# Patient Record
Sex: MALE | Race: WHITE | NOT HISPANIC OR LATINO | Employment: OTHER
[De-identification: names, ages, dates, MRNs, and addresses within clinical notes are randomized per-mention and may not be internally consistent; named-entity substitution may affect disease eponyms.]

---

## 2017-01-30 ENCOUNTER — SURGERY (OUTPATIENT)
Age: 82
End: 2017-01-30

## 2017-01-30 ENCOUNTER — HOSPITAL ENCOUNTER (OUTPATIENT)
Facility: CLINIC | Age: 82
Discharge: HOME | End: 2017-01-30
Attending: INTERNAL MEDICINE | Admitting: INTERNAL MEDICINE
Payer: MEDICARE

## 2017-01-30 VITALS
DIASTOLIC BLOOD PRESSURE: 53 MMHG | OXYGEN SATURATION: 99 % | HEART RATE: 65 BPM | WEIGHT: 155 LBS | BODY MASS INDEX: 22.19 KG/M2 | SYSTOLIC BLOOD PRESSURE: 121 MMHG | RESPIRATION RATE: 15 BRPM | HEIGHT: 70 IN

## 2017-01-30 LAB — COLONOSCOPY: NORMAL

## 2017-01-30 PROCEDURE — 88305 TISSUE EXAM BY PATHOLOGIST: CPT | Performed by: INTERNAL MEDICINE

## 2017-01-30 PROCEDURE — 45382 COLONOSCOPY W/CONTROL BLEED: CPT | Performed by: INTERNAL MEDICINE

## 2017-01-30 PROCEDURE — 45385 COLONOSCOPY W/LESION REMOVAL: CPT | Performed by: INTERNAL MEDICINE

## 2017-01-30 PROCEDURE — 45378 DIAGNOSTIC COLONOSCOPY: CPT | Performed by: INTERNAL MEDICINE

## 2017-01-30 RX ORDER — LIDOCAINE 40 MG/G
CREAM TOPICAL
Status: DISCONTINUED | OUTPATIENT
Start: 2017-01-30 | End: 2017-01-30 | Stop reason: HOSPADM

## 2017-01-30 RX ORDER — ONDANSETRON 2 MG/ML
4 INJECTION INTRAMUSCULAR; INTRAVENOUS
Status: DISCONTINUED | OUTPATIENT
Start: 2017-01-30 | End: 2017-01-30 | Stop reason: HOSPADM

## 2017-01-30 NOTE — LETTER
"Patient:  Philip Miranda  :   1933  MRN:     [id number]        Mr.John NADEEM Miranda  [address]  [facility] [address]        2017    Dear ,    We are writing to inform you of your test results.    The polyp that was removed during the colonoscopy revealed evidence of a \"tubular adenoma\", this is a type of polyp that if not removed, could develop into a colon cancer.  This was completely removed at the time of your colonoscopy.    I would recommend having a repeat surveillance colonoscopy in 3 years - please review these findings and recommendations with your primary care physician.      It has been a pleasure participating in your care.  Please feel free to contact our clinic with any further questions.      Sincerely,    Ezio Ramirez MD    [facility] - Department of Medicine  Division of Gastroenterology        Resulted Orders   Surgical pathology exam   Result Value Ref Range    Copath Report       Patient Name: PHILIP MIRANDA  MR#: [id number]  Specimen #: [id number]  Collected: 2017  Received: 2017  Reported: 2017 18:10  Ordering Phy(s): EZIO OQUENDO    For improved result formatting, select 'View Enhanced Report Format'  under Linked Documents section.    SPECIMEN(S):  Colon polyp, descending    FINAL DIAGNOSIS:  LARGE INTESTINE, DESCENDING COLON, POLYPECTOMY X 4:  - Tubular adenomas  - No evidence of high grade dysplasia    I have personally reviewed all specimens and or slides, including the  listed special stains, and used them with my medical judgement to  determine the final diagnosis.    Electronically signed out by:    Martha Gloria M.D., [facility]    CLINICAL HISTORY:  83 year old man with a history of colon cancer.    GROSS:  The specimen is received in formalin with proper patient identification,  labeled \"descending colon polyps\".  The specimen consists of nine  tan-pink soft tissue fragments that range from " 0.1-0.6 cm in greatest  dimension.   The specimen is entirely submitted in cassette 1. (Dictated  by: Maris Mitchell 1/30/2017 02:32 PM)    MICROSCOPIC:  Microscopic examination was performed.    CPT Codes:  A: 90655-XT7    TESTING LAB LOCATION:  [facility], [address]   [address]  [phone]    COLLECTION SITE:  Client: [facility]  Location: [facility])

## 2017-01-30 NOTE — IP AVS SNAPSHOT
[facility], [place], Endoscopy    [address] [facility] [address]    Phone:  [phone]                                       After Visit Summary   1/30/2017    Washington Abdi    MRN: [id number]           After Visit Summary Signature Page     I have received my discharge instructions, and my questions have been answered. I have discussed any challenges I see with this plan with the nurse or doctor.    ..........................................................................................................................................  Patient/Patient Representative Signature      ..........................................................................................................................................  Patient Representative Print Name and Relationship to Patient    ..................................................               ................................................  Date                                            Time    ..........................................................................................................................................  Reviewed by Signature/Title    ...................................................              ..............................................  Date                                                            Time

## 2017-01-30 NOTE — IP AVS SNAPSHOT
"                  MRN:[id number]                      After Visit Summary   1/30/2017    Washington Abdi    MRN: [id number]           Thank you!     Thank you for choosing [facility] for your care. Our goal is always to provide you with excellent care. Hearing back from our patients is one way we can continue to improve our services. Please take a few minutes to complete the written survey that you may receive in the mail after you visit with us. Thank you!        Patient Information     Date Of Birth          8/7/1933        About your hospital stay     You were admitted on:  January 30, 2017 You last received care in the:  [facility], Endoscopy    You were discharged on:  January 30, 2017       Who to Call     For medical emergencies, please call 911.  For non-urgent questions about your medical care, please call your primary care provider or clinic, [phone]  For questions related to your surgery, please call your surgery clinic        Attending Provider     Provider    Ezio Esparza MD       Primary Care Provider Office Phone # Fax #    Jose Lamb [phone]       [facility] [address] [facility] [address]  [facility] [address]        Your next 10 appointments already scheduled     Oct 02, 2017  1:00 PM   (Arrive by 12:45 PM)   Return Myasthenia Gravis with Dom Owens MD   [facility] Neurology ([facility])    [address] [address] [phone]              Pending Results     No orders found from 1/29/2017 to 1/31/2017.            Admission Information        Provider Department Dept Phone    1/30/2017 Ezio Ramirez MD [facility] [phone]      Your Vitals Were     Blood Pressure Pulse Respirations Height Weight BMI (Body Mass Index)    140/84 mmHg 65 20 1.778 m (5' 10\") 70.308 kg (155 lb) 22.24 kg/m2    Pulse Oximetry                   96%           MyChart Information     MyChart lets you " "send messages to your doctor, view your test results, renew your prescriptions, schedule appointments and more. To sign up, go to [email or web address] . Click on \"Log in\" on the left side of the screen, which will take you to the Welcome page. Then click on \"Sign up Now\" on the right side of the page.     You will be asked to enter the access code listed below, as well as some personal information. Please follow the directions to create your username and password.     Your access code is: [email or web address]  Expires: 2017 12:16 PM     Your access code will  in 90 days. If you need help or a new code, please call your [place] clinic or [phone].        Care EveryWhere ID     This is your Care EveryWhere ID. This could be used by other organizations to access your [place] medical records  [id number]           Review of your medicines      UNREVIEWED medicines. Ask your doctor about these medicines        Dose / Directions    atenolol 25 MG tablet   Commonly known as:  TENORMIN        Dose:  1 tablet   Take 1 tablet by mouth daily.   Refills:  0       azaTHIOprine 50 MG tablet   Commonly known as:  IMURAN   Used for:  Myasthenia gravis without exacerbation (H)        TAKE ONE TABLET BY MOUTH ONCE DAILY   Quantity:  90 tablet   Refills:  1       CALTRATE 600+D PLUS PO        Dose:  1 tablet   Take 1 tablet by mouth 2 times daily   Refills:  0       cyanocobalamin 1000 MCG tablet   Commonly known as:  vitamin  B-12        Dose:  1 tablet   Take 1 tablet by mouth daily. As directed   Refills:  0       folic acid 800 MCG Tabs        Dose:  2 tablet   Take 2 tablets by mouth daily.   Refills:  0       GLUCOSAMINE CHONDROITIN COMPLX Tabs        Dose:  1 tablet   Take 1 tablet by mouth daily.   Refills:  0       HYDROcodone-acetaminophen 5-325 MG per tablet   Commonly known as:  NORCO   Used for:  S/P arthroscopy of shoulder        Dose:  1 tablet   Take 1 tablet by mouth every 6 hours as needed for moderate " to severe pain   Quantity:  40 tablet   Refills:  0       latanoprost 0.005 % ophthalmic solution   Commonly known as:  XALATAN        Dose:  1 drop   Place 1 drop into both eyes At Bedtime   Refills:  0       LOSARTAN POTASSIUM PO        Dose:  25 mg   Take 25 mg by mouth daily   Refills:  0       Multi-vitamin Tabs tablet   Generic drug:  multivitamin, therapeutic with minerals        Dose:  1 tablet   Take 1 tablet by mouth daily.   Refills:  0       * predniSONE 1 MG tablet   Commonly known as:  DELTASONE        Dose:  6 mg   Take 6 mg by mouth daily As directed   Refills:  0       * predniSONE 5 MG tablet   Commonly known as:  DELTASONE   Used for:  Myasthenia gravis without exacerbation (H)        TAKE ONE TABLET BY MOUTH ONCE DAILY   Quantity:  90 tablet   Refills:  0       pyridostigmine 60 MG tablet   Commonly known as:  MESTINON   Used for:  Myasthenia gravis without exacerbation (H)        Take 1/2 tablet three times daily.   Quantity:  90 tablet   Refills:  5       VIAGRA PO        Dose:  100 mg   Take 100 mg by mouth as needed   Refills:  0       VITAMIN D3 PO        Dose:  2000 Units   Take 2,000 Units by mouth daily   Refills:  0       vitamin E 400 UNIT capsule        Dose:  1 capsule   Take 1 capsule by mouth every morning.   Refills:  0       * Notice:  This list has 2 medication(s) that are the same as other medications prescribed for you. Read the directions carefully, and ask your doctor or other care provider to review them with you.             Protect others around you: Learn how to safely use, store and throw away your medicines at www.disposemymeds.org.             Medication List: This is a list of all your medications and when to take them. Check marks below indicate your daily home schedule. Keep this list as a reference.      Medications           Morning Afternoon Evening Bedtime As Needed    atenolol 25 MG tablet   Commonly known as:  TENORMIN   Take 1 tablet by mouth daily.                                 azaTHIOprine 50 MG tablet   Commonly known as:  IMURAN   TAKE ONE TABLET BY MOUTH ONCE DAILY                                CALTRATE 600+D PLUS PO   Take 1 tablet by mouth 2 times daily                                cyanocobalamin 1000 MCG tablet   Commonly known as:  vitamin  B-12   Take 1 tablet by mouth daily. As directed                                folic acid 800 MCG Tabs   Take 2 tablets by mouth daily.                                GLUCOSAMINE CHONDROITIN COMPLX Tabs   Take 1 tablet by mouth daily.                                HYDROcodone-acetaminophen 5-325 MG per tablet   Commonly known as:  NORCO   Take 1 tablet by mouth every 6 hours as needed for moderate to severe pain                                latanoprost 0.005 % ophthalmic solution   Commonly known as:  XALATAN   Place 1 drop into both eyes At Bedtime                                LOSARTAN POTASSIUM PO   Take 25 mg by mouth daily                                Multi-vitamin Tabs tablet   Take 1 tablet by mouth daily.   Generic drug:  multivitamin, therapeutic with minerals                                * predniSONE 1 MG tablet   Commonly known as:  DELTASONE   Take 6 mg by mouth daily As directed                                * predniSONE 5 MG tablet   Commonly known as:  DELTASONE   TAKE ONE TABLET BY MOUTH ONCE DAILY                                pyridostigmine 60 MG tablet   Commonly known as:  MESTINON   Take 1/2 tablet three times daily.                                VIAGRA PO   Take 100 mg by mouth as needed                                VITAMIN D3 PO   Take 2,000 Units by mouth daily                                vitamin E 400 UNIT capsule   Take 1 capsule by mouth every morning.                                * Notice:  This list has 2 medication(s) that are the same as other medications prescribed for you. Read the directions carefully, and ask your doctor or other care provider to review them with you.

## 2017-01-30 NOTE — OR NURSING
Colonoscopy with polypectomies via hot snare completed. 1 [employer] clip placed. Erbe setting forced coag, effect 2, max webster 25. Skin to right thigh inspected prior to and post grounding pad placement, has remained intact. Patient tolerated procedure fairly well without sedation. At end of procedure, patient's blood pressures got as low as 50s/30s, and patient became diaphoretic. A/O x 4 throughout this, felt better after about 5 minutes, blood pressures improved back to baseline/WNL, please refer to flowsheets in EPIC. All vitals stable upon return to recovery area, Oxygen sats > 95% on room air.

## 2017-02-01 LAB — COPATH REPORT: NORMAL

## 2017-04-13 DIAGNOSIS — G70.00 MYASTHENIA GRAVIS WITHOUT EXACERBATION (H): ICD-10-CM

## 2017-04-13 RX ORDER — PREDNISONE 5 MG/1
5 TABLET ORAL DAILY
Qty: 90 TABLET | Refills: 1 | Status: SHIPPED | OUTPATIENT
Start: 2017-04-13 | End: 2017-10-16

## 2017-06-22 DIAGNOSIS — G70.00 MYASTHENIA GRAVIS WITHOUT EXACERBATION (H): ICD-10-CM

## 2017-06-24 RX ORDER — AZATHIOPRINE 50 MG/1
TABLET ORAL
Qty: 90 TABLET | Refills: 0 | Status: SHIPPED | OUTPATIENT
Start: 2017-06-24 | End: 2017-09-27

## 2017-09-27 DIAGNOSIS — G70.00 MYASTHENIA GRAVIS WITHOUT EXACERBATION (H): ICD-10-CM

## 2017-09-27 RX ORDER — AZATHIOPRINE 50 MG/1
TABLET ORAL
Qty: 90 TABLET | Refills: 0 | Status: SHIPPED | OUTPATIENT
Start: 2017-09-27 | End: 2017-10-16

## 2017-10-16 ENCOUNTER — OFFICE VISIT (OUTPATIENT)
Dept: NEUROLOGY | Facility: CLINIC | Age: 82
End: 2017-10-16

## 2017-10-16 VITALS
BODY MASS INDEX: 23.34 KG/M2 | WEIGHT: 163 LBS | SYSTOLIC BLOOD PRESSURE: 164 MMHG | DIASTOLIC BLOOD PRESSURE: 74 MMHG | HEIGHT: 70 IN | HEART RATE: 82 BPM

## 2017-10-16 DIAGNOSIS — G70.00 MYASTHENIA GRAVIS WITHOUT EXACERBATION (H): ICD-10-CM

## 2017-10-16 RX ORDER — PYRIDOSTIGMINE BROMIDE 60 MG/1
TABLET ORAL
Qty: 90 TABLET | Refills: 11 | Status: SHIPPED | OUTPATIENT
Start: 2017-10-16 | End: 2019-11-18

## 2017-10-16 RX ORDER — PREDNISONE 5 MG/1
5 TABLET ORAL DAILY
Qty: 90 TABLET | Refills: 11 | Status: SHIPPED | OUTPATIENT
Start: 2017-10-16 | End: 2018-11-05

## 2017-10-16 RX ORDER — AZATHIOPRINE 50 MG/1
50 TABLET ORAL DAILY
Qty: 90 TABLET | Refills: 11 | Status: SHIPPED | OUTPATIENT
Start: 2017-10-16 | End: 2018-12-10

## 2017-10-16 ASSESSMENT — PAIN SCALES - GENERAL: PAINLEVEL: NO PAIN (0)

## 2017-10-16 NOTE — MR AVS SNAPSHOT
After Visit Summary   10/16/2017    Washington Abdi    MRN: [id number]           Patient Information     Date Of Birth          1933        Visit Information        Provider Department      10/16/2017 8:30 AM Dom Owens MD [facility] Neurology        Today's Diagnoses     Myasthenia gravis without exacerbation (H)           Follow-ups after your visit        Follow-up notes from your care team     Return in about 1 year (around 10/16/2018).      Your next 10 appointments already scheduled     Oct 15, 2018  8:00 AM CDT   (Arrive by 7:45 AM)   Return Myasthenia Gravis with Dom Owens MD   [facility] Neurology ([facility])    [address] [place] [address] [phone]              Who to contact     Please call your clinic at [phone] to:    Ask questions about your health    Make or cancel appointments    Discuss your medicines    Learn about your test results    Speak to your doctor   If you have compliments or concerns about an experience at your clinic, or if you wish to file a complaint, please contact [facility] Patient Relations at [phone] or email us at [email or web address]         Additional Information About Your Visit        MyChart Information     [employer] is an electronic gateway that provides easy, online access to your medical records. With [employer], you can request a clinic appointment, read your test results, renew a prescription or communicate with your care team.     To sign up for [employer] visit the website at [email or web address]   You will be asked to enter the access code listed below, as well as some personal information. Please follow the directions to create your username and password.     Your access code is: [email or web address]  Expires: 2017  6:30 AM     Your access code will  in 90 days. If you need help or a new code, please contact your  "[facility] or call [phone] for assistance.        Care EveryWhere ID     This is your Care EveryWhere ID. This could be used by other organizations to access your [place] medical records  [id number]        Your Vitals Were     Pulse Height BMI (Body Mass Index)             82 1.778 m (5' 10\") 23.39 kg/m2          Blood Pressure from Last 3 Encounters:   10/16/17 164/74   01/30/17 121/53   10/03/16 142/81    Weight from Last 3 Encounters:   10/16/17 73.9 kg (163 lb)   01/30/17 70.3 kg (155 lb)   10/03/16 72.1 kg (159 lb)              Today, you had the following     No orders found for display         Today's Medication Changes          These changes are accurate as of: 10/16/17  9:06 AM.  If you have any questions, ask your nurse or doctor.               These medicines have changed or have updated prescriptions.        Dose/Directions    azaTHIOprine 50 MG tablet   Commonly known as:  IMURAN   This may have changed:  See the new instructions.   Used for:  Myasthenia gravis without exacerbation (H)        Dose:  50 mg   Take 1 tablet (50 mg) by mouth daily   Quantity:  90 tablet   Refills:  11       predniSONE 5 MG tablet   Commonly known as:  DELTASONE   This may have changed:  Another medication with the same name was removed. Continue taking this medication, and follow the directions you see here.   Used for:  Myasthenia gravis without exacerbation (H)        Dose:  5 mg   Take 1 tablet (5 mg) by mouth daily   Quantity:  90 tablet   Refills:  11         Stop taking these medicines if you haven't already. Please contact your care team if you have questions.     HYDROcodone-acetaminophen 5-325 MG per tablet   Commonly known as:  NORCO           VIAGRA PO           vitamin E 400 UNIT capsule                Where to get your medicines      These medications were sent to [facility] - [place], [place] - [address]  [address] [place] [address]     Phone:  [phone]     azaTHIOprine 50 MG tablet    predniSONE 5 MG tablet    pyridostigmine 60 MG tablet                Primary Care Provider Office Phone # Fax #    Jose Lamb [phone]       [facility] [address]  [place] [address]        Equal Access to Services     [facility]: Hadii aad ku hadasho Soomaali, waaxda luqadaha, qaybta kaalmada adeegyada, waxay idiin hayaan adeeg kharash la'aan . So [facility] [phone].    ATENCIÓN: Si habla español, tiene a granados disposición servicios gratuitos de asistencia lingüística. Harsh al [phone].    We comply with applicable federal civil rights laws and [place] laws. We do not discriminate on the basis of race, color, national origin, age, disability, sex, sexual orientation, or gender identity.            Thank you!     Thank you for choosing [facility] NEUROLOGY  for your care. Our goal is always to provide you with excellent care. Hearing back from our patients is one way we can continue to improve our services. Please take a few minutes to complete the written survey that you may receive in the mail after your visit with us. Thank you!             Your Updated Medication List - Protect others around you: Learn how to safely use, store and throw away your medicines at www.disposemymeds.org.          This list is accurate as of: 10/16/17  9:06 AM.  Always use your most recent med list.                   Brand Name Dispense Instructions for use Diagnosis    atenolol 25 MG tablet    TENORMIN     Take 1 tablet by mouth daily.        azaTHIOprine 50 MG tablet    IMURAN    90 tablet    Take 1 tablet (50 mg) by mouth daily    Myasthenia gravis without exacerbation (H)       CALTRATE 600+D PLUS PO      Take 1 tablet by mouth 2 times daily        cyanocobalamin 1000 MCG tablet    vitamin  B-12     Take 1 tablet by mouth daily. As directed        folic acid 800 MCG Tabs      Take 2 tablets by mouth  daily.        GLUCOSAMINE CHONDROITIN COMPLX Tabs      Take 1 tablet by mouth daily.        latanoprost 0.005 % ophthalmic solution    XALATAN     Place 1 drop into both eyes At Bedtime        LOSARTAN POTASSIUM PO      Take 25 mg by mouth daily        Multi-vitamin Tabs tablet   Generic drug:  multivitamin, therapeutic with minerals      Take 1 tablet by mouth daily.        predniSONE 5 MG tablet    DELTASONE    90 tablet    Take 1 tablet (5 mg) by mouth daily    Myasthenia gravis without exacerbation (H)       pyridostigmine 60 MG tablet    MESTINON    90 tablet    Take 1/2 tablet three times daily.    Myasthenia gravis without exacerbation (H)       VITAMIN D3 PO      Take 2,000 Units by mouth daily

## 2017-10-16 NOTE — LETTER
10/16/2017       RE: Washington Abdi  [address]  [facility] [address]     Dear Colleague,    Thank you for referring your patient, Washington Abdi, to the [facility] NEUROLOGY at [facility]. Please see a copy of my visit note below.    [facility], [place]   Neurology Daily Note          Assessment and Plan:   Mr. Abdi is a pleasant 85 yo gentleman as you know who has acquired autoimmune sero-positive myasthenia gravis who has been stable on his current regimen.     Plan  1. Continue Prednisone 5mg Daily, refills written  2. Continue Azathioprine 50mg daily, refills written  3. Continue Vitamin D and Caclium Supplements  4. Recommend continued diabetes screening and bone health screening with PCP.   5. Return in 1 year       Attestation:  This patient has been seen and evaluated by me, Dom Owens.  Discussed with the house staff team and agree with the findings and plan in this note.  In all I spent at least 15 minutes with more than half of time spent in discussion and coordinating care.    Dom Owens MD          Interval History:   Mr. Abdi was seen in clinic for myasthenia gravis today for a follow up visit. He was last seen in clinic on 10/5/2016. He was diagnosed with MG in the 1990s. He has not had any new medical issues, procedures or hospitalizations in the past year. He is tolerating his medications well. He is not missing any doses. He follows regularly with his PCP and had screening blood work recently, records are not readily available to us. He believes his A1c was 5.6 and is keeping an eye on this with his PCP.      Once in a while he will get double vision at the end of the day when he is tired. No ptosis, sometimes   the right eye lid is more droopy than the left, he says according to his wife. No dysphasia, or dysphagia.   No neck weakness. No weight changes. No missed doses of medication. Continues to play  golf.   No trouble breathing. No orthopnea. Uses sticks when he exercises, walks about a mile a day.   When walking up bleachers he uses his cane otherwise does not use assistive device.          Review of Systems:   The 10 point Review of Systems is negative other than noted in the HPI          Medications:   Imuran 50mg once a day  Prednisone 5mg daily  Mestinon 30mg PRN when golfing  Other meds as per medical record          Neurology Focused Physical Exam:   The following assessments were done and were normal unless otherwise specified:  General Comments:   WN, WG, NAD     Mental Status:   Level of consciousness - normal  Orientation - normal  Language - normal  Memory - normal  Attention / concentration - normal  Fund of knowledge - normal   Cranial Nerves:   Cranial nerves II through XII are normal except: suple ptosis of the right eye. upgaze fatigues well over 20 seconds.    Motor:   Muscle bulk- abnormal: atrophy in the LLE. otherwise bulk is normal  Muscle tone - normal  Muscle strength - abnormal: LLE hip flexion 4/5, plantar and dorsiflexions 4+  Arm drift - none  Speed and dexterity - normal   Sensory perception:   Pain and temperature - normal  Vibration - abnormal: absent at the knee and ankle in the LLE o/w normal  Rhomberg test - normal   Reflexes:   Deep tendon reflexes - abnormal: absent in the LLE otherwise 2+ throughout   Cerebellar Coordination:   Finger to nose- normal  Heel to shin - normal  Trunk stability - normal   Gait / Stance:   Wide based, careful gait with slight spasticity in the LLE.             Again, thank you for allowing me to participate in the care of your patient.      Sincerely,    Dom Owens MD

## 2017-10-16 NOTE — PROGRESS NOTES
[facility], [place]   Neurology Daily Note          Assessment and Plan:   Mr. Abdi is a pleasant 83 yo gentleman as you know who has acquired autoimmune sero-positive myasthenia gravis who has been stable on his current regimen.     Plan  1. Continue Prednisone 5mg Daily, refills written  2. Continue Azathioprine 50mg daily, refills written  3. Continue Vitamin D and Caclium Supplements  4. Recommend continued diabetes screening and bone health screening with PCP.   5. Return in 1 year       Attestation:  This patient has been seen and evaluated by me, Dom Owens.  Discussed with the house staff team and agree with the findings and plan in this note.  In all I spent at least 15 minutes with more than half of time spent in discussion and coordinating care.    Dom Owens MD          Interval History:   Mr. Abdi was seen in clinic for myasthenia gravis today for a follow up visit. He was last seen in clinic on 10/5/2016. He was diagnosed with MG in the 1990s. He has not had any new medical issues, procedures or hospitalizations in the past year. He is tolerating his medications well. He is not missing any doses. He follows regularly with his PCP and had screening blood work recently, records are not readily available to us. He believes his A1c was 5.6 and is keeping an eye on this with his PCP.      Once in a while he will get double vision at the end of the day when he is tired. No ptosis, sometimes   the right eye lid is more droopy than the left, he says according to his wife. No dysphasia, or dysphagia.   No neck weakness. No weight changes. No missed doses of medication. Continues to play golf.   No trouble breathing. No orthopnea. Uses sticks when he exercises, walks about a mile a day.   When walking up bleachers he uses his cane otherwise does not use assistive device.          Review of Systems:   The 10 point Review of Systems is negative other than noted in  the HPI          Medications:   Imuran 50mg once a day  Prednisone 5mg daily  Mestinon 30mg PRN when golfing  Other meds as per medical record          Neurology Focused Physical Exam:   The following assessments were done and were normal unless otherwise specified:  General Comments:   WN, WG, NAD     Mental Status:   Level of consciousness - normal  Orientation - normal  Language - normal  Memory - normal  Attention / concentration - normal  Fund of knowledge - normal   Cranial Nerves:   Cranial nerves II through XII are normal except: suple ptosis of the right eye. upgaze fatigues well over 20 seconds.    Motor:   Muscle bulk- abnormal: atrophy in the LLE. otherwise bulk is normal  Muscle tone - normal  Muscle strength - abnormal: LLE hip flexion 4/5, plantar and dorsiflexions 4+  Arm drift - none  Speed and dexterity - normal   Sensory perception:   Pain and temperature - normal  Vibration - abnormal: absent at the knee and ankle in the LLE o/w normal  Rhomberg test - normal   Reflexes:   Deep tendon reflexes - abnormal: absent in the LLE otherwise 2+ throughout   Cerebellar Coordination:   Finger to nose- normal  Heel to shin - normal  Trunk stability - normal   Gait / Stance:   Wide based, careful gait with slight spasticity in the LLE.

## 2018-10-15 ENCOUNTER — OFFICE VISIT (OUTPATIENT)
Dept: NEUROLOGY | Facility: CLINIC | Age: 83
End: 2018-10-15
Payer: MEDICARE

## 2018-10-15 VITALS
BODY MASS INDEX: 23.68 KG/M2 | HEIGHT: 70 IN | SYSTOLIC BLOOD PRESSURE: 165 MMHG | DIASTOLIC BLOOD PRESSURE: 73 MMHG | WEIGHT: 165.4 LBS | HEART RATE: 74 BPM

## 2018-10-15 DIAGNOSIS — G70.00 MYASTHENIA GRAVIS WITHOUT EXACERBATION (H): Primary | ICD-10-CM

## 2018-10-15 ASSESSMENT — PAIN SCALES - GENERAL: PAINLEVEL: NO PAIN (0)

## 2018-10-15 NOTE — LETTER
10/15/2018       RE: Washington Abdi  [address]  [place] [facility] [address]     Dear Colleague,    Thank you for referring your patient, Washington Abdi, to the [facility] NEUROLOGY at [facility]. Please see a copy of my visit note below.    [facility], [place]   Neurology Daily Note               Assessment and Plan:    Mr. Abdi is a pleasant 86 yo gentleman as you know who has acquired autoimmune sero-positive myasthenia gravis who has been stable on his current regimen.      Plan  1. Continue Prednisone 5mg Daily,  2. Continue Azathioprine 50mg daily  3. Continue Vitamin D and Caclium Supplements  4. Recommend continued diabetes screening and bone health screening with PCP.   5. CBC and CMP every 6 months  6. Return in 1 year     Ivette Mcfadden DO   [facility] Neuromuscular Fellow [phone]    Patient seen and evaluated with Dr. Owens    Attestation: I personally examined this patient, reviewed vital signs and pertinent auxiliary test results.    This note details our findings, impression and plan that we formulated together.    I spent total of 15 minutes face-to-face with Washington Abdi during today's visit.   Over 50% of this time was spent counseling the patient and coordinating care. See note for details.                    Interval History:        Mr. Abdi was seen in clinic for myasthenia gravis today for a follow up visit. He was last seen in   clinic on 10/16/2017. He was diagnosed with MG in the 1990s. He is tolerating his medications well.   He is not missing any doses. He follows regularly with his PCP and had screening blood work recently,   records are not readily available to us. His last A1c was 6.0 and is keeping an eye on this with his PCP.    He had an episode of worsening PMR symptoms in July and was was placed on 20mg pf Prednisonen   July and is now weaned down to 7mg. He will eventually go back down to 5mg. He was  also diagnosed   with atrial fibrillation this past year and was started on Warfarin. He doesn't really get double vision anymore.   When it does occur, he'll take 1 tablet of Mestinon 30mg.  Once in a while his right eye droops a little bit at   the end of the day. Denies any head drop, dyspnea, orthopnea, change in his voice (only when he talks   excessively). No weight changes. No missed doses of medication. Continues to play golf.  Uses sticks   when he exercises, walks about a mile a day. When walking up bleachers he uses his cane otherwise   does not use assistive device.     i       Review of Systems:   The 10 point Review of Systems is negative other than noted in the HPI           Medications:   Imuran 50mg once a day  Prednisone 5mg daily  Mestinon 30mg PRN when golfing  Other meds as per medical record           Neurology Focused Physical Exam:   The following assessments were done and were normal unless otherwise specified:  General Comments:    WN, WG, NAD      Mental Status:    Level of consciousness - normal  Orientation - normal  Language - normal  Memory - normal  Attention / concentration - normal  Fund of knowledge - normal   Cranial Nerves:    Cranial nerves II through XII are normal except: suple ptosis of the right eye. upgaze fatigues well over 20 seconds.    Motor:    Muscle bulk- abnormal: atrophy in the LLE. otherwise bulk is normal  Muscle tone - normal  Muscle strength - abnormal: LLE hip flexion 4/5, plantar and dorsiflexions 4+  Arm drift - none  Speed and dexterity - normal   Sensory perception:    Pain and temperature - normal  Vibration - abnormal: absent at the knee and ankle in the LLE o/w normal  Rhomberg test - normal   Reflexes:    Deep tendon reflexes - abnormal: absent in the LLE otherwise 2+ throughout   Cerebellar Coordination:    Finger to nose- normal  Heel to shin - normal  Trunk stability - normal   Gait / Stance:    Wide based, careful gait with slight spasticity in the  ZIGGYE.         Again, thank you for allowing me to participate in the care of your patient.      Sincerely,    Dom Owens MD

## 2018-10-15 NOTE — PROGRESS NOTES
[facility], [place]   Neurology Daily Note               Assessment and Plan:    Mr. Abdi is a pleasant 84 yo gentleman as you know who has acquired autoimmune sero-positive myasthenia gravis who has been stable on his current regimen.      Plan  1. Continue Prednisone 5mg Daily,  2. Continue Azathioprine 50mg daily  3. Continue Vitamin D and Caclium Supplements  4. Recommend continued diabetes screening and bone health screening with PCP.   5. CBC and CMP every 6 months  6. Return in 1 year     Ivette Mcfadden DO   [facility] Neuromuscular Fellow [phone]    Patient seen and evaluated with Dr. Owens    Attestation: I personally examined this patient, reviewed vital signs and pertinent auxiliary test results.    This note details our findings, impression and plan that we formulated together.    I spent total of 15 minutes face-to-face with Washington Abdi during today's visit.   Over 50% of this time was spent counseling the patient and coordinating care. See note for details.    Sincerely yours,      Dom Owens MD  Pediatric Neurology  [phone]                  Interval History:        Mr. Abdi was seen in clinic for myasthenia gravis today for a follow up visit. He was last seen in   clinic on 10/16/2017. He was diagnosed with MG in the 1990s. He is tolerating his medications well.   He is not missing any doses. He follows regularly with his PCP and had screening blood work recently,   records are not readily available to us. His last A1c was 6.0 and is keeping an eye on this with his PCP.    He had an episode of worsening PMR symptoms in July and was was placed on 20mg pf Prednisonen   July and is now weaned down to 7mg. He will eventually go back down to 5mg. He was also diagnosed   with atrial fibrillation this past year and was started on Warfarin. He doesn't really get double vision anymore.   When it does occur, he'll take 1 tablet of Mestinon 30mg.  Once  in a while his right eye droops a little bit at   the end of the day. Denies any head drop, dyspnea, orthopnea, change in his voice (only when he talks   excessively). No weight changes. No missed doses of medication. Continues to play golf.  Uses sticks   when he exercises, walks about a mile a day. When walking up bleachers he uses his cane otherwise   does not use assistive device.     i       Review of Systems:   The 10 point Review of Systems is negative other than noted in the HPI           Medications:   Imuran 50mg once a day  Prednisone 5mg daily  Mestinon 30mg PRN when golfing  Other meds as per medical record           Neurology Focused Physical Exam:   The following assessments were done and were normal unless otherwise specified:  General Comments:    WN, WG, NAD      Mental Status:    Level of consciousness - normal  Orientation - normal  Language - normal  Memory - normal  Attention / concentration - normal  Fund of knowledge - normal   Cranial Nerves:    Cranial nerves II through XII are normal except: suple ptosis of the right eye. upgaze fatigues well over 20 seconds.    Motor:    Muscle bulk- abnormal: atrophy in the LLE. otherwise bulk is normal  Muscle tone - normal  Muscle strength - abnormal: LLE hip flexion 4/5, plantar and dorsiflexions 4+  Arm drift - none  Speed and dexterity - normal   Sensory perception:    Pain and temperature - normal  Vibration - abnormal: absent at the knee and ankle in the LLE o/w normal  Rhomberg test - normal   Reflexes:    Deep tendon reflexes - abnormal: absent in the LLE otherwise 2+ throughout   Cerebellar Coordination:    Finger to nose- normal  Heel to shin - normal  Trunk stability - normal   Gait / Stance:    Wide based, careful gait with slight spasticity in the LLE.

## 2018-10-15 NOTE — MR AVS SNAPSHOT
"              After Visit Summary   10/15/2018    Washington Abdi    MRN: [id number]           Patient Information     Date Of Birth          1933        Visit Information        Provider Department      10/15/2018 8:00 AM Dom Owens MD [facility] Neurology         Follow-ups after your visit        Follow-up notes from your care team     Return in about 1 year (around 10/15/2019).      Who to contact     Please call your clinic at [phone] to:    Ask questions about your health    Make or cancel appointments    Discuss your medicines    Learn about your test results    Speak to your doctor            Additional Information About Your Visit        MyChart Information     M[employer]ert is an electronic gateway that provides easy, online access to your medical records. With [employer], you can request a clinic appointment, read your test results, renew a prescription or communicate with your care team.     To sign up for M[employer] visit the website at [email or web address]   You will be asked to enter the access code listed below, as well as some personal information. Please follow the directions to create your username and password.     Your access code is: [email or web address]  Expires: 2018  6:31 AM     Your access code will  in 90 days. If you need help or a new code, please contact your [facility] or call [phone] for assistance.        Care EveryWhere ID     This is your Care EveryWhere ID. This could be used by other organizations to access your [facility] medical records  [id number]        Your Vitals Were     Pulse Height BMI (Body Mass Index)             74 1.778 m (5' 10\") 23.73 kg/m2          Blood Pressure from Last 3 Encounters:   10/15/18 165/73   10/16/17 164/74   17 121/53    Weight from Last 3 Encounters:   10/15/18 75 kg (165 lb 6.4 oz)   10/16/17 73.9 kg (163 lb)   17 70.3 kg (155 lb)              Today, you had the following     No " orders found for display       Primary Care Provider Office Phone # Fax #    Jsoe Lamb [phone]       [facility] [address]  [place] [facility] [address]        Equal Access to Services     JENNIFER BEGUM : Hadii sherine ku hadkorio Soomaali, waaxda luqadaha, qaybta kaalmada adejankiyada, sharona neenain hayaan karenjanki sorto lasamytesha . So [facility] [phone].    ATENCIÓN: Si habla español, tiene a granados disposición servicios gratuitos de asistencia lingüística. Llame al [phone].    We comply with applicable federal civil rights laws and [place] laws. We do not discriminate on the basis of race, color, national origin, age, disability, sex, sexual orientation, or gender identity.            Thank you!     Thank you for choosing [facility] NEUROLOGY  for your care. Our goal is always to provide you with excellent care. Hearing back from our patients is one way we can continue to improve our services. Please take a few minutes to complete the written survey that you may receive in the mail after your visit with us. Thank you!             Your Updated Medication List - Protect others around you: Learn how to safely use, store and throw away your medicines at www.disposemymeds.org.          This list is accurate as of 10/15/18  8:39 AM.  Always use your most recent med list.                   Brand Name Dispense Instructions for use Diagnosis    atenolol 25 MG tablet    TENORMIN     Take 1 tablet by mouth daily.        azaTHIOprine 50 MG tablet    IMURAN    90 tablet    Take 1 tablet (50 mg) by mouth daily    Myasthenia gravis without exacerbation (H)       CALTRATE 600+D PLUS PO      Take 1 tablet by mouth 2 times daily        cyanocobalamin 1000 MCG tablet    vitamin  B-12     Take 1 tablet by mouth daily. As directed        folic acid 800 MCG Tabs      Take 2 tablets by mouth daily.        GLUCOSAMINE CHONDROITIN COMPLX Tabs      Take 1 tablet by mouth daily.        latanoprost 0.005 %  ophthalmic solution    XALATAN     Place 1 drop into both eyes At Bedtime        LOSARTAN POTASSIUM PO      Take 25 mg by mouth daily        Multi-vitamin Tabs tablet   Generic drug:  multivitamin, therapeutic with minerals      Take 1 tablet by mouth daily.        predniSONE 5 MG tablet    DELTASONE    90 tablet    Take 1 tablet (5 mg) by mouth daily    Myasthenia gravis without exacerbation (H)       pyridostigmine 60 MG tablet    MESTINON    90 tablet    Take 1/2 tablet three times daily.    Myasthenia gravis without exacerbation (H)       VITAMIN D3 PO      Take 2,000 Units by mouth daily        WARFARIN SODIUM PO      Take by mouth daily

## 2018-11-05 DIAGNOSIS — G70.00 MYASTHENIA GRAVIS WITHOUT EXACERBATION (H): ICD-10-CM

## 2018-11-05 RX ORDER — PREDNISONE 5 MG/1
TABLET ORAL
Qty: 90 TABLET | Refills: 1 | Status: SHIPPED | OUTPATIENT
Start: 2018-11-05 | End: 2019-06-03

## 2018-12-10 DIAGNOSIS — G70.00 MYASTHENIA GRAVIS WITHOUT EXACERBATION (H): ICD-10-CM

## 2018-12-10 RX ORDER — AZATHIOPRINE 50 MG/1
TABLET ORAL
Qty: 90 TABLET | Refills: 10 | Status: SHIPPED | OUTPATIENT
Start: 2018-12-10 | End: 2019-11-18

## 2019-06-03 DIAGNOSIS — G70.00 MYASTHENIA GRAVIS WITHOUT EXACERBATION (H): ICD-10-CM

## 2019-06-03 RX ORDER — PREDNISONE 5 MG/1
TABLET ORAL
Qty: 90 TABLET | Refills: 1 | Status: SHIPPED | OUTPATIENT
Start: 2019-06-03 | End: 2019-11-18

## 2019-11-18 ENCOUNTER — RESEARCH ENCOUNTER (OUTPATIENT)
Dept: NEUROLOGY | Facility: CLINIC | Age: 84
End: 2019-11-18

## 2019-11-18 ENCOUNTER — OFFICE VISIT (OUTPATIENT)
Dept: NEUROLOGY | Facility: CLINIC | Age: 84
End: 2019-11-18
Payer: MEDICARE

## 2019-11-18 VITALS
DIASTOLIC BLOOD PRESSURE: 81 MMHG | HEIGHT: 70 IN | HEART RATE: 67 BPM | BODY MASS INDEX: 22.68 KG/M2 | WEIGHT: 158.4 LBS | SYSTOLIC BLOOD PRESSURE: 174 MMHG

## 2019-11-18 DIAGNOSIS — G70.00 MYASTHENIA GRAVIS WITHOUT EXACERBATION (H): ICD-10-CM

## 2019-11-18 RX ORDER — AZATHIOPRINE 50 MG/1
50 TABLET ORAL DAILY
Qty: 90 TABLET | Refills: 11 | Status: SHIPPED | OUTPATIENT
Start: 2019-11-18 | End: 2020-03-23

## 2019-11-18 RX ORDER — PREDNISONE 5 MG/1
5 TABLET ORAL DAILY
Qty: 90 TABLET | Refills: 11 | Status: SHIPPED | OUTPATIENT
Start: 2019-11-18 | End: 2019-12-18

## 2019-11-18 RX ORDER — PYRIDOSTIGMINE BROMIDE 60 MG/1
30 TABLET ORAL 3 TIMES DAILY
Qty: 90 TABLET | Refills: 11 | Status: SHIPPED | OUTPATIENT
Start: 2019-11-18 | End: 2020-10-19

## 2019-11-18 ASSESSMENT — PAIN SCALES - GENERAL: PAINLEVEL: NO PAIN (0)

## 2019-11-18 ASSESSMENT — MIFFLIN-ST. JEOR: SCORE: 1404.75

## 2019-11-18 NOTE — LETTER
11/18/2019       RE: Washington Abdi  [address]  [place] [facility] [address]     Dear Colleague,    Thank you for referring your patient, Washington Abdi, to the [facility] NEUROLOGY at [facility]. Please see a copy of my visit note below.                 [facility] Physicians                  Muscular Dystrophy Clinic Note     Washington Abdi MRN# [id number]   YOB: 1933 Age: 86 year old      Date of Visit: Nov 18, 2019    Primary care provider: Jose Lamb           Interval Change:      Washington Abdi is a 86 year old male was seen and examined at the muscular dystrophy clinic on Nov 18, 2019 for evaluation of myasthenia gravis.  Overall he is doing well.  He continues to participate in all activities he desires.  He continues to take mestinon as needed when he is singing in a choir and when he develops double vision. It does help. Able to take stairs with caution. He feels he is not limited in his activities and ADL's.  He continues to take a small dose of azathioprine.  Polymyalgia rheumatica is stable on current low-dose of prednisone.          Allergies:      Allergies   Allergen Reactions     Aspirin      SUBDURAL HEMORRHAGES     Latex Rash             Medications:     Prescription Medications as of 11/18/2019       Rx Number Disp Refills Start End Last Dispensed Date Next Fill Date Owning Pharmacy    atenolol (TENORMIN) 25 MG tablet            Sig: Take 1 tablet by mouth daily.    Class: Historical    Route: Oral    azaTHIOprine (IMURAN) 50 MG tablet  90 tablet 10 12/10/2018    [facility] [address] [place] [address]    Sig: TAKE 1 TABLET BY MOUTH ONCE DAILY    Class: E-Prescribe    Calcium Carbonate-Vit D-Min (CALTRATE 600+D PLUS PO)            Sig: Take 1 tablet by mouth 2 times daily    Class: Historical    Route: Oral    Cholecalciferol (VITAMIN D3 PO)            Sig: Take 2,000 Units by mouth daily    Class:  "Historical    Route: Oral    cyanocolbalamin (VITAMIN B-12) 1000 MCG tablet            Sig: Take 1 tablet by mouth daily. As directed    Class: Historical    Route: Oral    Folic Acid 800 MCG TABS            Sig: Take 2 tablets by mouth daily.    Class: Historical    Route: Oral    Glucosamine-Chondroit-Vit C-Mn (GLUCOSAMINE CHONDROITIN COMPLX) TABS            Sig: Take 1 tablet by mouth daily.    Class: Historical    Route: Oral    latanoprost (XALATAN) 0.005 % ophthalmic solution            Sig: Place 1 drop into both eyes At Bedtime    Class: Historical    Route: Both Eyes    LOSARTAN POTASSIUM PO            Sig: Take 25 mg by mouth daily    Class: Historical    Route: Oral    Multiple Vitamin (MULTI-VITAMIN) per tablet            Sig: Take 1 tablet by mouth daily.    Class: Historical    Route: Oral    predniSONE (DELTASONE) 5 MG tablet  90 tablet 1 6/3/2019    [facility] [address] [place] [address] [id number] [place] [address]    Sig: TAKE 1 TABLET BY MOUTH ONCE DAILY    Class: E-Prescribe    pyridostigmine (MESTINON) 60 MG tablet  90 tablet 11 10/16/2017    [facility] [address] [place] [address]    Sig: Take 1/2 tablet three times daily.    Class: E-Prescribe    WARFARIN SODIUM PO            Sig: Take by mouth daily    Class: Historical    Route: Oral                Review of Systems:   Answers for HPI/ROS submitted by the patient on 11/18/2019   General Symptoms: No  Skin Symptoms: No  HENT Symptoms: No  EYE SYMPTOMS: No  HEART SYMPTOMS: No  LUNG SYMPTOMS: No  INTESTINAL SYMPTOMS: No  URINARY SYMPTOMS: No  REPRODUCTIVE SYMPTOMS: No  SKELETAL SYMPTOMS: No  BLOOD SYMPTOMS: No  NERVOUS SYSTEM SYMPTOMS: No  MENTAL HEALTH SYMPTOMS: No         Physical Exam:     BP (!) 174/81 (BP Location: Left arm, Patient Position: Chair, Cuff Size: Adult Regular)   Pulse 67   Ht 5' 10\" (177.8 cm)   Wt 158 lb 6.4 oz (71.8 kg)   BMI 22.73 kg/m      Physical Exam:   General: NAD  Head: " Normocephalic, atraumatic  Eyes: No conjunctival injection, no scleral icterus.  Mouth: No oral lesions, no erythema or exudate in the oropharynx  Respiratory: No increased work of breathing  Cardiovascular: No lower extremity edema  Abdomen: Soft, non-tender, without organomegaly.  Extremities: Warm, dry  Neurologic:   Mental Status Exam: Alert, awake and easily engaged in interaction.   Cranial Nerves: PERRLA, EOMs intact, no nystagmus, facial movements symmetric,                 facial sensation intact to light touch, hearing intact to conversation, palate and uvula               rise symmetrically, no deviation in uvula or tongue, tongue midline and fully mobile                with no atrophy or fasciculations.    Motor: Normal tone in all four extremities, no atrophy or fasciculations.             Manual Motor Exam     Right Left A F  Right Left A F   Shoulder Abduction 5 5   Hip Flexion 5 4     Elbow Flexion 5 5   Knee Extension 5 5     Elbow Extension 5 5   Knee Flexion 5 5     Wrist Extension 5 5   Foot Dorsiflexion 5 5     Wrist flexion 5 5   Foot Plantar Flexion 5 5     Hip abduction 5 5   Hip adduction 5 4                                                                                                                                                        Reflexes   Right Left  Right Left   Biceps 2 2 Patellar 2 2   Triceps 2 2 Achilles Trace Reinf only Trace Reinf only   Brachioradialis 2 2 Babinski Absent Absent      Gait:Normal                Assessment and Recommendations:   Washington Abdi is a 86 year old male with 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  His course has been complicated gated by polymyalgia rheumatica which is well controlled on low-dose of prednisone.    Recommendations:  -Continue Imuran 50 mg  daily  -Continue prednisone 10 mg daily.  - Continue Mestinon 30 mg on as-needed basis.  - Consider bone scan at his primary clinic if it was not done recently.    I spent total of 15 minutes in face-to-face during today's visit. Over 50% of this time was spent counseling the patient and coordinating care. See note for details.    Dom Owens MD  [phone]         Patient Care Team:  Jose Lamb MD as PCP - General (Internal Medicine)  Dom Owens MD as MD (Neurology)  Clinic, [facility]  ESTABLISHED PATIENT    Copy to patient  PHILIP SILVER RUBEN  [address]  [facility] [address]

## 2019-11-18 NOTE — NURSING NOTE
Chief Complaint   Patient presents with     RECHECK     [facility]- MYASTHENIA GRAVIS     Jenni West MA

## 2019-11-18 NOTE — PROGRESS NOTES
[facility] Neuromuscular Registry    IRB # [id number]  PI: Marlon Roth MD, PhD  : Tamiko Robert    Patient was approached for possible participation for the above study. The current approved IRB consent form was discussed and explained to the patient.  It was discussed that involvement with the study is voluntary and refusal to participate would not involve penalty or decrease benefits at which the patient is entitled, and the subject may discontinue his/her involvement at any time without penalty or loss in benefits. We also discussed that this study does not have follow up visits or procedures. Patient was informed that an additional contact might occur if data needed was not found in patient s medical record. The patient was given time to review and ask any questions about the consent. Patient was shown contact information for PI and study staff in consent for future questions. Patient verbalized understanding of consent and study by restating the purpose, procedures, duration, risk, confidentiality of PHI, and voluntarily participation. Patient printed, signed and dated the consent and HIPAA form prior to study involvement. A copy was given to the patient for their records.     Subject Consent/HIPAA : SIGNED ON 11/18/19

## 2019-11-18 NOTE — PROGRESS NOTES
[facility]                  Muscular Dystrophy Clinic Note     Washington Abdi MRN# [id number]   YOB: 1933 Age: 86 year old      Date of Visit: Nov 18, 2019    Primary care provider: Jose Lamb Change:      Washington Abdi is a 86 year old male was seen and examined at the muscular dystrophy clinic on Nov 18, 2019 for evaluation of myasthenia gravis.  Overall he is doing well.  He continues to participate in all activities he desires.  He continues to take mestinon as needed when he is singing in a choir and when he develops double vision. It does help. Able to take stairs with caution. He feels he is not limited in his activities and ADL's.  He continues to take a small dose of azathioprine.  Polymyalgia rheumatica is stable on current low-dose of prednisone.          Allergies:      Allergies   Allergen Reactions     Aspirin      SUBDURAL HEMORRHAGES     Latex Rash             Medications:     Prescription Medications as of 11/18/2019       Rx Number Disp Refills Start End Last Dispensed Date Next Fill Date Owning Pharmacy    atenolol (TENORMIN) 25 MG tablet            Sig: Take 1 tablet by mouth daily.    Class: Historical    Route: Oral    azaTHIOprine (IMURAN) 50 MG tablet  90 tablet 10 12/10/2018    [facility] [address] [place] [address]    Sig: TAKE 1 TABLET BY MOUTH ONCE DAILY    Class: E-Prescribe    Calcium Carbonate-Vit D-Min (CALTRATE 600+D PLUS PO)            Sig: Take 1 tablet by mouth 2 times daily    Class: Historical    Route: Oral    Cholecalciferol (VITAMIN D3 PO)            Sig: Take 2,000 Units by mouth daily    Class: Historical    Route: Oral    cyanocolbalamin (VITAMIN B-12) 1000 MCG tablet            Sig: Take 1 tablet by mouth daily. As directed    Class: Historical    Route: Oral    Folic Acid 800 MCG TABS            Sig: Take 2 tablets by mouth daily.    Class: Historical    Route: Oral     "Glucosamine-Chondroit-Vit C-Mn (GLUCOSAMINE CHONDROITIN COMPLX) TABS            Sig: Take 1 tablet by mouth daily.    Class: Historical    Route: Oral    latanoprost (XALATAN) 0.005 % ophthalmic solution            Sig: Place 1 drop into both eyes At Bedtime    Class: Historical    Route: Both Eyes    LOSARTAN POTASSIUM PO            Sig: Take 25 mg by mouth daily    Class: Historical    Route: Oral    Multiple Vitamin (MULTI-VITAMIN) per tablet            Sig: Take 1 tablet by mouth daily.    Class: Historical    Route: Oral    predniSONE (DELTASONE) 5 MG tablet  90 tablet 1 6/3/2019    [facility] [address] [facility] [address] [address] [place] [address]    Sig: TAKE 1 TABLET BY MOUTH ONCE DAILY    Class: E-Prescribe    pyridostigmine (MESTINON) 60 MG tablet  90 tablet 11 10/16/2017    [facility] [address] [facility] [address] [place] [address]    Sig: Take 1/2 tablet three times daily.    Class: E-Prescribe    WARFARIN SODIUM PO            Sig: Take by mouth daily    Class: Historical    Route: Oral                Review of Systems:   Answers for HPI/ROS submitted by the patient on 11/18/2019   General Symptoms: No  Skin Symptoms: No  HENT Symptoms: No  EYE SYMPTOMS: No  HEART SYMPTOMS: No  LUNG SYMPTOMS: No  INTESTINAL SYMPTOMS: No  URINARY SYMPTOMS: No  REPRODUCTIVE SYMPTOMS: No  SKELETAL SYMPTOMS: No  BLOOD SYMPTOMS: No  NERVOUS SYSTEM SYMPTOMS: No  MENTAL HEALTH SYMPTOMS: No         Physical Exam:     BP (!) 174/81 (BP Location: Left arm, Patient Position: Chair, Cuff Size: Adult Regular)   Pulse 67   Ht 5' 10\" (177.8 cm)   Wt 158 lb 6.4 oz (71.8 kg)   BMI 22.73 kg/m     Physical Exam:   General: NAD  Head: Normocephalic, atraumatic  Eyes: No conjunctival injection, no scleral icterus.  Mouth: No oral lesions, no erythema or exudate in the oropharynx  Respiratory: No increased work of breathing  Cardiovascular: No lower extremity edema  Abdomen: Soft, non-tender, without " organomegaly.  Extremities: Warm, dry  Neurologic:   Mental Status Exam: Alert, awake and easily engaged in interaction.   Cranial Nerves: PERRLA, EOMs intact, no nystagmus, facial movements symmetric,                 facial sensation intact to light touch, hearing intact to conversation, palate and uvula               rise symmetrically, no deviation in uvula or tongue, tongue midline and fully mobile                with no atrophy or fasciculations.    Motor: Normal tone in all four extremities, no atrophy or fasciculations.             Manual Motor Exam     Right Left A F  Right Left A F   Shoulder Abduction 5 5   Hip Flexion 5 4     Elbow Flexion 5 5   Knee Extension 5 5     Elbow Extension 5 5   Knee Flexion 5 5     Wrist Extension 5 5   Foot Dorsiflexion 5 5     Wrist flexion 5 5   Foot Plantar Flexion 5 5     Hip abduction 5 5   Hip adduction 5 4                                                                                                                                                        Reflexes   Right Left  Right Left   Biceps 2 2 Patellar 2 2   Triceps 2 2 Achilles Trace Reinf only Trace Reinf only   Brachioradialis 2 2 Babinski Absent Absent      Gait:Normal                Assessment and Recommendations:   Washington Abdi is a 86 year old male with 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  His course has been complicated gated by polymyalgia rheumatica which is well controlled on low-dose of prednisone.    Recommendations:  -Continue Imuran 50 mg daily  -Continue prednisone 10 mg daily.  - Continue Mestinon 30 mg on as-needed basis.  - Consider bone scan at his primary clinic if it was not done recently.    I spent total of 15 minutes in face-to-face during today's visit. Over 50% of this time was spent counseling  the patient and coordinating care. See note for details.    Dom Owens MD  [phone]       CC  Patient Care Team:  Jose Lamb MD as PCP - General (Internal Medicine)  Dom Owens MD as MD (Neurology)  Clinic, [facility] PATIENT    Copy to patient  PHILIP MIRANDA  [address]  [facility] [address]

## 2020-03-23 DIAGNOSIS — G70.00 MYASTHENIA GRAVIS WITHOUT EXACERBATION (H): ICD-10-CM

## 2020-03-23 NOTE — TELEPHONE ENCOUNTER
[facility] Call Center    Phone Message    May a detailed message be left on voicemail: yes     Reason for Call: Medication Refill Request    Has the patient contacted the pharmacy for the refill? Yes   Name of medication being requested: azaTHIOprine (IMURAN) 50 MG tablet,predniSONE (DELTASONE) 5 MG tablet   Provider who prescribed the medication:   Pharmacy: [address] [address]  Phone: [phone]  Date medication is needed: As soon as possible pt running low         Action Taken: Message routed to:  [facility] neurology    Travel Screening: Not Applicable

## 2020-03-24 RX ORDER — AZATHIOPRINE 50 MG/1
50 TABLET ORAL DAILY
Qty: 90 TABLET | Refills: 1 | Status: SHIPPED | OUTPATIENT
Start: 2020-03-24 | End: 2020-09-22

## 2020-09-22 DIAGNOSIS — G70.00 MYASTHENIA GRAVIS WITHOUT EXACERBATION (H): ICD-10-CM

## 2020-09-22 RX ORDER — AZATHIOPRINE 50 MG/1
TABLET ORAL
Qty: 90 TABLET | Refills: 1 | Status: SHIPPED | OUTPATIENT
Start: 2020-09-22 | End: 2022-01-03

## 2020-10-19 DIAGNOSIS — G70.00 MYASTHENIA GRAVIS WITHOUT EXACERBATION (H): ICD-10-CM

## 2020-10-19 RX ORDER — PYRIDOSTIGMINE BROMIDE 60 MG/1
30 TABLET ORAL 3 TIMES DAILY
Qty: 90 TABLET | Refills: 11 | Status: SHIPPED | OUTPATIENT
Start: 2020-10-19 | End: 2021-12-20

## 2020-10-19 NOTE — TELEPHONE ENCOUNTER
[facility] Call Center    Phone Message    May a detailed message be left on voicemail: yes     Reason for Call: Medication Refill Request    Has the patient contacted the pharmacy for the refill? Yes   Name of medication being requested: pyridostigmine (MESTINON) 60 MG tablet  Provider who prescribed the medication: Dr. Owens   Pharmacy: [id number] [address] AT [facility] [address]  Date medication is needed: as soon as possible            Action Taken: Message routed to:  [facility]  neuro    Travel Screening: Not Applicable

## 2021-01-18 ENCOUNTER — VIRTUAL VISIT (OUTPATIENT)
Dept: NEUROLOGY | Facility: CLINIC | Age: 86
End: 2021-01-18
Payer: MEDICARE

## 2021-01-18 DIAGNOSIS — G70.00 MYASTHENIA GRAVIS WITHOUT EXACERBATION (H): Primary | ICD-10-CM

## 2021-01-18 PROCEDURE — 99213 OFFICE O/P EST LOW 20 MIN: CPT | Mod: 95 | Performed by: PSYCHIATRY & NEUROLOGY

## 2021-01-18 RX ORDER — PREDNISONE 5 MG/1
1 TABLET ORAL DAILY
COMMUNITY
Start: 2020-11-20 | End: 2021-08-19

## 2021-01-18 NOTE — LETTER
1/18/2021       RE: Washington Abdi  [address]  [facility] [address]     Dear Colleague,    Thank you for referring your patient, Washington Abdi, to the [facility] NEUROLOGY CL[facility]IC [place] at [facility]. Please see a copy of my visit note below.    Washington is a 87 year old who is being evaluated via a billable telephone visit.      What phone number would you like to be contacted at? [phone]  How would you like to obtain your AVS? Mail a copy                     Muscular Dystrophy Clinic Telephone Visit      Washington Abdi MRN# [id number]   YOB: 1933 Age: 87 year old      Date of Visit: Jan 18, 2021    Primary care provider: Jose Lamb      History is obtained from the patient, family and medical record       Interval Change:      Washington Abdi is a 87 year old male was seen and examined at the pediatric muscular dystrophy clinic on Jan 18, 2021  Via telephone visit for a follow up evaluation of previously diagnosed generalized myasthenia gravis.    He is doing well and reports no new issues or concerns. He reports on getting somewhat weaker but no task specific or fatigable weakness reported. He has occasionally diplopia mainly when he is palying golf. He can still do 9 holes. He continues to take Azathioprine 50 mg, He takes pyridostigmine once a while. He takes in special circumstances such as playing golf.   Prednisone 5 mg daily. He lives with the wife in the house and is independent in all activities of daily living. He has a pacemaker that has been working well.             Immunizations:     There is no immunization history on file for this patient.         Allergies:      Allergies   Allergen Reactions     Aspirin      SUBDURAL HEMORRHAGES     Latex Rash             Medications:     Prescription Medications as of 1/18/2021       Rx Number Disp Refills Start End Last Dispensed Date Next Fill Date Owning Pharmacy    atenolol (TENORMIN) 25  MG tablet            Sig: Take 1 tablet by mouth daily.    Class: Historical    Route: Oral    azaTHIOprine (IMURAN) 50 MG tablet  90 tablet 1 9/22/2020    [id number] - [place] - [address] [facility] [address] AT [facility] [address]    Sig: TAKE 1 TABLET BY MOUTH EVERY DAY    Class: E-Prescribe    Calcium Carbonate-Vit D-Min (CALTRATE 600+D PLUS PO)            Sig: Take 1 tablet by mouth 2 times daily    Class: Historical    Route: Oral    Cholecalciferol (VITAMIN D3 PO)            Sig: Take 2,000 Units by mouth daily    Class: Historical    Route: Oral    cyanocolbalamin (VITAMIN B-12) 1000 MCG tablet            Sig: Take 1 tablet by mouth daily. As directed    Class: Historical    Route: Oral    Folic Acid 800 MCG TABS            Sig: Take 2 tablets by mouth daily.    Class: Historical    Route: Oral    Glucosamine-Chondroit-Vit C-Mn (GLUCOSAMINE CHONDROITIN COMPLX) TABS            Sig: Take 1 tablet by mouth daily.    Class: Historical    Route: Oral    latanoprost (XALATAN) 0.005 % ophthalmic solution            Sig: Place 1 drop into both eyes At Bedtime    Class: Historical    Route: Both Eyes    LOSARTAN POTASSIUM PO            Sig: Take 25 mg by mouth daily    Class: Historical    Route: Oral    Multiple Vitamin (MULTI-VITAMIN) per tablet            Sig: Take 1 tablet by mouth daily.    Class: Historical    Route: Oral    predniSONE (DELTASONE) 5 MG tablet    11/20/2020        Sig: Take 1 tablet by mouth daily    Class: Historical    Route: Oral    pyridostigmine (MESTINON) 60 MG tablet  90 tablet 11 10/19/2020    [facility][id number] - [place], [place] - [address] [facility] [address] [facility] [address] AT [facility] [address]    Sig: Take 0.5 tablets (30 mg) by mouth 3 times daily Or as needed before activities    Class: E-Prescribe    Route: Oral    WARFARIN SODIUM PO            Sig: Take by mouth daily    Class: Historical    Route: Oral                Review of Systems:   The 10 point Review of Systems is  negative other than noted in the HPI                Data:   CBC:  Lab Results   Component Value Date    WBC 9.3 10/26/2015     Lab Results   Component Value Date    RBC 3.83 10/26/2015     Lab Results   Component Value Date    HGB 12.1 10/26/2015     Lab Results   Component Value Date    HCT 36.8 10/26/2015     No components found for: MCT  Lab Results   Component Value Date    MCV 96 10/26/2015     Lab Results   Component Value Date    MCH 31.6 10/26/2015     Lab Results   Component Value Date    MCHC 32.9 10/26/2015     Lab Results   Component Value Date    RDW 13.5 10/26/2015     Lab Results   Component Value Date     10/26/2015       Last Basic Metabolic Panel:  Lab Results   Component Value Date     02/02/2010      Lab Results   Component Value Date    POTASSIUM 4.0 02/02/2010     Lab Results   Component Value Date    CHLORIDE 99 02/02/2010     Lab Results   Component Value Date    TYE 10.1 02/02/2010     Lab Results   Component Value Date    CO2 33 02/02/2010     Lab Results   Component Value Date    BUN 25 02/02/2010     Lab Results   Component Value Date    CR 0.79 02/02/2010     Lab Results   Component Value Date     02/02/2010       I have reviewed his labs from September of the last year which showed no overt abnormality, but transaminases were not done.            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 His course has been complicated gated by polymyalgia rheumatica which is well controlled on low-dose of prednisone.     Recommendations:  -Continue Imuran 50 mg daily  -Continue prednisone 5 mg daily.  - Continue Mestinon 30 mg on as-needed basis.  -Check ALT and AST next check  -follow up in 1 year    Washington is a 87 year old who is being evaluated via a billable telephone visit.      What phone number would you like to be contacted at?   How would  you like to obtain your AVS? MyChart    Start 9:20  End 9:35  Phone call duration: 15 minutes     I have spent  20 min spent on the date of the encounter in chart review, patient visit, review of tests, counseling the patient, documentation and/or discussion with other providers about the issues documented above. See note for details.    Sincerely,        Dom Owens MD  Pediatric Neurology  [phone]           CC  Patient Care Team:  Jose Lamb MD as PCP - General (Internal Medicine)  Clinic, [facility]    Copy to patient  PHILIP NADEEM MIRANDA  [address]  [facility] [place] [address]

## 2021-01-18 NOTE — PROGRESS NOTES
Washington is a 87 year old who is being evaluated via a billable telephone visit.      What phone number would you like to be contacted at? [phone]  How would you like to obtain your AVS? Mail a copy

## 2021-01-18 NOTE — PROGRESS NOTES
Muscular Dystrophy Clinic Telephone Visit      Washington Abdi MRN# [id number]   YOB: 1933 Age: 87 year old      Date of Visit: Jan 18, 2021    Primary care provider: Jose Lamb      History is obtained from the patient, family and medical record       Interval Change:      Washington Abdi is a 87 year old male was seen and examined at the pediatric muscular dystrophy clinic on Jan 18, 2021  Via telephone visit for a follow up evaluation of previously diagnosed generalized myasthenia gravis.    He is doing well and reports no new issues or concerns. He reports on getting somewhat weaker but no task specific or fatigable weakness reported. He has occasionally diplopia mainly when he is palying golf. He can still do 9 holes. He continues to take Azathioprine 50 mg, He takes pyridostigmine once a while. He takes in special circumstances such as playing golf.   Prednisone 5 mg daily. He lives with the wife in the house and is independent in all activities of daily living. He has a pacemaker that has been working well.             Immunizations:     There is no immunization history on file for this patient.         Allergies:      Allergies   Allergen Reactions     Aspirin      SUBDURAL HEMORRHAGES     Latex Rash             Medications:     Prescription Medications as of 1/18/2021       Rx Number Disp Refills Start End Last Dispensed Date Next Fill Date Owning Pharmacy    atenolol (TENORMIN) 25 MG tablet            Sig: Take 1 tablet by mouth daily.    Class: Historical    Route: Oral    azaTHIOprine (IMURAN) 50 MG tablet  90 tablet 1 9/22/2020    [id number] AT [facility] & [facility]    Sig: TAKE 1 TABLET BY MOUTH EVERY DAY    Class: E-Prescribe    Calcium Carbonate-Vit D-Min (CALTRATE 600+D PLUS PO)            Sig: Take 1 tablet by mouth 2 times daily    Class: Historical    Route: Oral    Cholecalciferol (VITAMIN D3 PO)            Sig:  Take 2,000 Units by mouth daily    Class: Historical    Route: Oral    cyanocolbalamin (VITAMIN B-12) 1000 MCG tablet            Sig: Take 1 tablet by mouth daily. As directed    Class: Historical    Route: Oral    Folic Acid 800 MCG TABS            Sig: Take 2 tablets by mouth daily.    Class: Historical    Route: Oral    Glucosamine-Chondroit-Vit C-Mn (GLUCOSAMINE CHONDROITIN COMPLX) TABS            Sig: Take 1 tablet by mouth daily.    Class: Historical    Route: Oral    latanoprost (XALATAN) 0.005 % ophthalmic solution            Sig: Place 1 drop into both eyes At Bedtime    Class: Historical    Route: Both Eyes    LOSARTAN POTASSIUM PO            Sig: Take 25 mg by mouth daily    Class: Historical    Route: Oral    Multiple Vitamin (MULTI-VITAMIN) per tablet            Sig: Take 1 tablet by mouth daily.    Class: Historical    Route: Oral    predniSONE (DELTASONE) 5 MG tablet    11/20/2020        Sig: Take 1 tablet by mouth daily    Class: Historical    Route: Oral    pyridostigmine (MESTINON) 60 MG tablet  90 tablet 11 10/19/2020    [facility] #[id number] [address] AT [facility] & [facility] [address]    Sig: Take 0.5 tablets (30 mg) by mouth 3 times daily Or as needed before activities    Class: E-Prescribe    Route: Oral    WARFARIN SODIUM PO            Sig: Take by mouth daily    Class: Historical    Route: Oral                Review of Systems:   The 10 point Review of Systems is negative other than noted in the HPI                Data:   CBC:  Lab Results   Component Value Date    WBC 9.3 10/26/2015     Lab Results   Component Value Date    RBC 3.83 10/26/2015     Lab Results   Component Value Date    HGB 12.1 10/26/2015     Lab Results   Component Value Date    HCT 36.8 10/26/2015     No components found for: MCT  Lab Results   Component Value Date    MCV 96 10/26/2015     Lab Results   Component Value Date    MCH 31.6 10/26/2015     Lab Results   Component Value Date    MCHC  32.9 10/26/2015     Lab Results   Component Value Date    RDW 13.5 10/26/2015     Lab Results   Component Value Date     10/26/2015       Last Basic Metabolic Panel:  Lab Results   Component Value Date     02/02/2010      Lab Results   Component Value Date    POTASSIUM 4.0 02/02/2010     Lab Results   Component Value Date    CHLORIDE 99 02/02/2010     Lab Results   Component Value Date    TYE 10.1 02/02/2010     Lab Results   Component Value Date    CO2 33 02/02/2010     Lab Results   Component Value Date    BUN 25 02/02/2010     Lab Results   Component Value Date    CR 0.79 02/02/2010     Lab Results   Component Value Date     02/02/2010       I have reviewed his labs from September of the last year which showed no overt abnormality, but transaminases were not done.            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 His course has been complicated gated by polymyalgia rheumatica which is well controlled on low-dose of prednisone.     Recommendations:  -Continue Imuran 50 mg daily  -Continue prednisone 5 mg daily.  - Continue Mestinon 30 mg on as-needed basis.  -Check ALT and AST next check  -follow up in 1 year    Washington is a 87 year old who is being evaluated via a billable telephone visit.      What phone number would you like to be contacted at?   How would you like to obtain your AVS? MyChart    Start 9:20  End 9:35  Phone call duration: 15 minutes     I have spent  20 min spent on the date of the encounter in chart review, patient visit, review of tests, counseling the patient, documentation and/or discussion with other providers about the issues documented above. See note for details.    Sincerely,        Dom Owens MD  Pediatric Neurology  [phone]           CC  Patient Care Team:  Jose Lamb MD as PCP - General (Internal  Medicine)  Dom Owens MD as MD (Neurology)  Clinic, [facility]  Dom Owens MD as Assigned Neuroscience Provider  SELF, REFERRED    Copy to patient  PHILIP NADEEM MIRANDA  [address] [facility] [address]

## 2021-08-19 DIAGNOSIS — G70.00 MYASTHENIA GRAVIS WITHOUT EXACERBATION (H): Primary | ICD-10-CM

## 2021-08-19 RX ORDER — PREDNISONE 5 MG/1
5 TABLET ORAL DAILY
Qty: 30 TABLET | Refills: 3 | Status: SHIPPED | OUTPATIENT
Start: 2021-08-19 | End: 2022-01-03

## 2021-08-19 NOTE — TELEPHONE ENCOUNTER
[facility] Call Center    Phone Message    May a detailed message be left on voicemail: yes     Reason for Call: Medication Refill Request    Has the patient contacted the pharmacy for the refill? Yes   Name of medication being requested: predniSONE (DELTASONE) 5 MG tablet [03311] (Order [id number])  Provider who prescribed the medication: Dom Owens MD  Pharmacy: [facility] [address] [place] [address] N AT [facility] & [facility] [address]  Date medication is needed: ASAP      Action Taken: Message routed to:  [facility] Neurology    Travel Screening: Not Applicable

## 2021-11-16 ENCOUNTER — TELEPHONE (OUTPATIENT)
Dept: NEUROLOGY | Facility: CLINIC | Age: 86
End: 2021-11-16
Payer: COMMERCIAL

## 2021-11-16 NOTE — TELEPHONE ENCOUNTER
[facility] Call Center    Phone Message    May a detailed message be left on voicemail: yes     Reason for Call: Other: Washington calling to inform Dr. Owens's team that his insurance company  needs a prior authorization called to [facility] at [phone].     Action Taken: Message routed to:  [facility]    Travel Screening: Not Applicable

## 2021-11-16 NOTE — TELEPHONE ENCOUNTER
Spoke with patient. Medication has not been ordered by Dr Owens since 9/2020. Pt will check with prescribing physician.

## 2021-12-20 DIAGNOSIS — G70.00 MYASTHENIA GRAVIS WITHOUT EXACERBATION (H): ICD-10-CM

## 2021-12-20 RX ORDER — PYRIDOSTIGMINE BROMIDE 60 MG/1
30 TABLET ORAL 3 TIMES DAILY
Qty: 135 TABLET | Refills: 1 | Status: SHIPPED | OUTPATIENT
Start: 2021-12-20 | End: 2024-01-17

## 2022-01-03 ENCOUNTER — VIRTUAL VISIT (OUTPATIENT)
Dept: NEUROLOGY | Facility: CLINIC | Age: 87
End: 2022-01-03
Payer: MEDICARE

## 2022-01-03 DIAGNOSIS — G70.00 MYASTHENIA GRAVIS WITHOUT EXACERBATION (H): ICD-10-CM

## 2022-01-03 PROCEDURE — 99442 PR PHYSICIAN TELEPHONE EVALUATION 11-20 MIN: CPT | Performed by: PSYCHIATRY & NEUROLOGY

## 2022-01-03 RX ORDER — PREDNISONE 5 MG/1
5 TABLET ORAL DAILY
Qty: 90 TABLET | Refills: 3 | Status: SHIPPED | OUTPATIENT
Start: 2022-01-03 | End: 2023-01-16

## 2022-01-03 RX ORDER — DILTIAZEM HYDROCHLORIDE 180 MG/1
180 CAPSULE, EXTENDED RELEASE ORAL DAILY
COMMUNITY

## 2022-01-03 RX ORDER — AZATHIOPRINE 50 MG/1
50 TABLET ORAL DAILY
Qty: 90 TABLET | Refills: 3 | Status: SHIPPED | OUTPATIENT
Start: 2022-01-03 | End: 2023-03-21

## 2022-01-03 NOTE — PROGRESS NOTES
Washington is a 88 year old who is being evaluated via a billable telephone visit.      What phone number would you like to be contacted at? [phone]  How would you like to obtain your AVS? Jarrett  Phone call duration: 15 minutes                 [facility]                  Muscular Dystrophy Clinic Note     Washington Abdi MRN# [id number]   YOB: 1933 Age: 88 year old      Date of Visit: Keaton 3, 2022    Primary care provider: Jose Lamb      History is obtained from the patient, family and medical record       Interval Change:      Washington Abdi is a 88 year old male was seen and examined at the muscular dystrophy clinic on Keaton 3, 2022 via telephone visit for evaluation of myasthenia gravis. He is doing well. He does have occasional double vision, which is appears when he is tired. This is responsive to a prn dose of Pyridostigmine. He continues low dose of Prednisone 5 mg daily and Azathioprine 50 mg.  He denies generalized weakness, difficulty in swallowing or respiratory symptoms.  He was diagnosed with a-fib which is refractory to pharmacological treatment. He is scheduled to see an electrophysiologist for possible ablation.              Allergies:      Allergies   Allergen Reactions     Aspirin      SUBDURAL HEMORRHAGES     Latex Rash             Medications:     Prescription Medications as of 1/3/2022       Rx Number Disp Refills Start End Last Dispensed Date Next Fill Date Owning Pharmacy    atenolol (TENORMIN) 25 MG tablet            Sig: Take 1 tablet by mouth daily.    Class: Historical    Route: Oral    azaTHIOprine (IMURAN) 50 MG tablet  90 tablet 1 9/22/2020    [facility] #[id number] - [place] [address] [address] [facility] [address]    Sig: TAKE 1 TABLET BY MOUTH EVERY DAY    Class: E-Prescribe    Calcium Carbonate-Vit D-Min (CALTRATE 600+D PLUS PO)            Sig: Take 1 tablet by mouth 2 times daily    Class: Historical    Route: Oral     Cholecalciferol (VITAMIN D3 PO)            Sig: Take 2,000 Units by mouth daily    Class: Historical    Route: Oral    cyanocolbalamin (VITAMIN B-12) 1000 MCG tablet            Sig: Take 1 tablet by mouth daily. As directed    Class: Historical    Route: Oral    diltiazem ER (DILT-XR) 180 MG 24 hr capsule        [facility] #[id number] [facility], [place] - [address] AT [place] [address]    Sig: Take 180 mg by mouth daily    Class: Historical    Route: Oral    Folic Acid 800 MCG TABS            Sig: Take 2 tablets by mouth daily.    Class: Historical    Route: Oral    Glucosamine-Chondroit-Vit C-Mn (GLUCOSAMINE CHONDROITIN COMPLX) TABS            Sig: Take 1 tablet by mouth daily.    Class: Historical    Route: Oral    latanoprost (XALATAN) 0.005 % ophthalmic solution            Sig: Place 1 drop into both eyes At Bedtime    Class: Historical    Route: Both Eyes    LOSARTAN POTASSIUM PO            Sig: Take 25 mg by mouth daily    Class: Historical    Route: Oral    Multiple Vitamin (MULTI-VITAMIN) per tablet            Sig: Take 1 tablet by mouth daily.    Class: Historical    Route: Oral    predniSONE (DELTASONE) 5 MG tablet  30 tablet 3 8/19/2021    [facility] #[id number] [facility], [place] - [address] AT [place] [address]    Sig: Take 1 tablet (5 mg) by mouth daily    Class: E-Prescribe    Route: Oral    pyridostigmine (MESTINON) 60 MG tablet  135 tablet 1 12/20/2021    [facility] #[id number] [facility], [place] - [address] AT [place] [address]    Sig: Take 0.5 tablets (30 mg) by mouth 3 times daily Or as needed before activities    Class: E-Prescribe    Route: Oral    WARFARIN SODIUM PO            Sig: Take by mouth daily    Class: Historical    Route: Oral                     Data:   CBC:  Lab Results   Component Value Date    WBC 9.3 10/26/2015     Lab Results   Component Value Date    RBC 3.83 10/26/2015     Lab Results   Component Value Date    HGB 12.1  10/26/2015     Lab Results   Component Value Date    HCT 36.8 10/26/2015     No components found for: MCT  Lab Results   Component Value Date    MCV 96 10/26/2015     Lab Results   Component Value Date    MCH 31.6 10/26/2015     Lab Results   Component Value Date    MCHC 32.9 10/26/2015     Lab Results   Component Value Date    RDW 13.5 10/26/2015     Lab Results   Component Value Date     10/26/2015       Last Basic Metabolic Panel:  Lab Results   Component Value Date     02/02/2010      Lab Results   Component Value Date    POTASSIUM 4.0 02/02/2010     Lab Results   Component Value Date    CHLORIDE 99 02/02/2010     Lab Results   Component Value Date    TYE 10.1 02/02/2010     Lab Results   Component Value Date    CO2 33 02/02/2010     Lab Results   Component Value Date    BUN 25 02/02/2010     Lab Results   Component Value Date    CR 0.79 02/02/2010     Lab Results   Component Value Date     02/02/2010          Assessment and Recommendations:   Philip Abdi is a 88 year old male presents with ocular myasthenia gravis under cgood control with mild fluctuation.   His course is complicated by development of a-fib.    Recommendations:  -Continue low dose of Prednisone, Azathioprine, and pyridostigmine.  -Return to clinic in 12 months.     I have spent at least 18 min on the date of the encounter in chart review, patient visit, review of tests, counseling the patient, documentation about the issues documented above. See note for details.    Sincerely,        Dom Owens MD  Pediatric Neurology  [phone]           CC  Patient Care Team:  Jose Lamb MD as PCP - General (Internal Medicine)  Dom Owens MD as MD (Neurology)  Clinic, [facility]  Dom Owens MD as Assigned Neuroscience Provider  SELF, REFERRED    Copy to patient  PHILIP ABDI  [address]  [facility] [address]

## 2022-01-03 NOTE — LETTER
1/3/2022       RE: Washington Abdi  [address]  [facility] [address]     Dear Colleague,    Thank you for referring your patient, Washington Abdi, to the [facility] NEUROLOGY CLINIC [place] [facility]. Please see a copy of my visit note below.             [facility]                  Muscular Dystrophy Clinic Note     Washington Abdi MRN# [id number]   YOB: 1933 Age: 88 year old      Date of Visit: Keaton 3, 2022    Primary care provider: Jose Lamb      History is obtained from the patient, family and medical record       Interval Change:      Washington Abdi is a 88 year old male was seen and examined at the muscular dystrophy clinic on Keaton 3, 2022 via telephone visit for evaluation of myasthenia gravis. He is doing well. He does have occasional double vision, which is appears when he is tired. This is responsive to a prn dose of Pyridostigmine. He continues low dose of Prednisone 5 mg daily and Azathioprine 50 mg.  He denies generalized weakness, difficulty in swallowing or respiratory symptoms.  He was diagnosed with a-fib which is refractory to pharmacological treatment. He is scheduled to see an electrophysiologist for possible ablation.              Allergies:      Allergies   Allergen Reactions     Aspirin      SUBDURAL HEMORRHAGES     Latex Rash             Medications:     Prescription Medications as of 1/3/2022       Rx Number Disp Refills Start End Last Dispensed Date Next Fill Date Owning Pharmacy    atenolol (TENORMIN) 25 MG tablet            Sig: Take 1 tablet by mouth daily.    Class: Historical    Route: Oral    azaTHIOprine (IMURAN) 50 MG tablet  90 tablet 1 9/22/2020    [facility] #[id number] - [place], [place] - [address] AT [facility] [address]    Sig: TAKE 1 TABLET BY MOUTH EVERY DAY    Class: E-Prescribe    Calcium Carbonate-Vit D-Min (CALTRATE 600+D PLUS PO)            Sig: Take 1  tablet by mouth 2 times daily    Class: Historical    Route: Oral    Cholecalciferol (VITAMIN D3 PO)            Sig: Take 2,000 Units by mouth daily    Class: Historical    Route: Oral    cyanocolbalamin (VITAMIN B-12) 1000 MCG tablet            Sig: Take 1 tablet by mouth daily. As directed    Class: Historical    Route: Oral    diltiazem ER (DILT-XR) 180 MG 24 hr capsule        [facility] #[id number] [facility], [place] - [address] AT [place] [address]    Sig: Take 180 mg by mouth daily    Class: Historical    Route: Oral    Folic Acid 800 MCG TABS            Sig: Take 2 tablets by mouth daily.    Class: Historical    Route: Oral    Glucosamine-Chondroit-Vit C-Mn (GLUCOSAMINE CHONDROITIN COMPLX) TABS            Sig: Take 1 tablet by mouth daily.    Class: Historical    Route: Oral    latanoprost (XALATAN) 0.005 % ophthalmic solution            Sig: Place 1 drop into both eyes At Bedtime    Class: Historical    Route: Both Eyes    LOSARTAN POTASSIUM PO            Sig: Take 25 mg by mouth daily    Class: Historical    Route: Oral    Multiple Vitamin (MULTI-VITAMIN) per tablet            Sig: Take 1 tablet by mouth daily.    Class: Historical    Route: Oral    predniSONE (DELTASONE) 5 MG tablet  30 tablet 3 8/19/2021    [facility] #[id number] [facility], [place] - [address] [facility] [address] AT [place] [address]    Sig: Take 1 tablet (5 mg) by mouth daily    Class: E-Prescribe    Route: Oral    pyridostigmine (MESTINON) 60 MG tablet  135 tablet 1 12/20/2021    [facility] #[id number] [place] [address], [place] - [address] AT [place] [address]    Sig: Take 0.5 tablets (30 mg) by mouth 3 times daily Or as needed before activities    Class: E-Prescribe    Route: Oral    WARFARIN SODIUM PO            Sig: Take by mouth daily    Class: Historical    Route: Oral                     Data:   CBC:  Lab Results   Component Value Date    WBC 9.3 10/26/2015     Lab Results   Component Value Date     RBC 3.83 10/26/2015     Lab Results   Component Value Date    HGB 12.1 10/26/2015     Lab Results   Component Value Date    HCT 36.8 10/26/2015     No components found for: MCT  Lab Results   Component Value Date    MCV 96 10/26/2015     Lab Results   Component Value Date    MCH 31.6 10/26/2015     Lab Results   Component Value Date    MCHC 32.9 10/26/2015     Lab Results   Component Value Date    RDW 13.5 10/26/2015     Lab Results   Component Value Date     10/26/2015       Last Basic Metabolic Panel:  Lab Results   Component Value Date     02/02/2010      Lab Results   Component Value Date    POTASSIUM 4.0 02/02/2010     Lab Results   Component Value Date    CHLORIDE 99 02/02/2010     Lab Results   Component Value Date    TYE 10.1 02/02/2010     Lab Results   Component Value Date    CO2 33 02/02/2010     Lab Results   Component Value Date    BUN 25 02/02/2010     Lab Results   Component Value Date    CR 0.79 02/02/2010     Lab Results   Component Value Date     02/02/2010          Assessment and Recommendations:   Philip Abdi is a 88 year old male presents with ocular myasthenia gravis under cgood control with mild fluctuation.   His course is complicated by development of a-fib.    Recommendations:  -Continue low dose of Prednisone, Azathioprine, and pyridostigmine.  -Return to clinic in 12 months.     I have spent at least 18 min on the date of the encounter in chart review, patient visit, review of tests, counseling the patient, documentation about the issues documented above. See note for details.    Sincerely,        Dom Owens MD  Pediatric Neurology  [phone]           CC  Patient Care Team:  Jose Lamb MD as PCP - General (Internal Medicine)  Dom Owens MD as MD (Neurology)  Clinic, [facility]  Dom Owens MD as Assigned Neuroscience Provider  SELF, REFERRED    Copy to patient  PHILIP ABDI  [address]  [place]  [address]

## 2023-01-14 DIAGNOSIS — G70.00 MYASTHENIA GRAVIS WITHOUT EXACERBATION (H): ICD-10-CM

## 2023-01-17 RX ORDER — PREDNISONE 5 MG/1
TABLET ORAL
Qty: 90 TABLET | Refills: 0 | Status: SHIPPED | OUTPATIENT
Start: 2023-01-17 | End: 2023-04-10

## 2023-03-21 DIAGNOSIS — G70.00 MYASTHENIA GRAVIS WITHOUT EXACERBATION (H): ICD-10-CM

## 2023-03-21 RX ORDER — AZATHIOPRINE 50 MG/1
TABLET ORAL
Qty: 90 TABLET | Refills: 1 | Status: SHIPPED | OUTPATIENT
Start: 2023-03-21 | End: 2023-10-16

## 2023-04-10 DIAGNOSIS — G70.00 MYASTHENIA GRAVIS WITHOUT EXACERBATION (H): ICD-10-CM

## 2023-04-10 RX ORDER — PREDNISONE 5 MG/1
TABLET ORAL
Qty: 90 TABLET | Refills: 0 | Status: SHIPPED | OUTPATIENT
Start: 2023-04-10 | End: 2023-08-21

## 2023-04-11 ENCOUNTER — TELEPHONE (OUTPATIENT)
Dept: NEUROLOGY | Facility: CLINIC | Age: 88
End: 2023-04-11
Payer: MEDICARE

## 2023-04-11 NOTE — TELEPHONE ENCOUNTER
[facility] Call Center    Phone Message    May a detailed message be left on voicemail: yes     Reason for Call: Other: Pt is calling wanting to speak to Dr. Owens about Vygard. Pt states his leg muscles have gotten worse recently and he saw an add on tv talking about Vygard and wonders if this would be an option for him. Please contact pt back to discuss.     Action Taken: Message routed to:  [facility] ([facility] Neurology    Travel Screening: Not Applicable

## 2023-06-19 ENCOUNTER — THERAPY VISIT (OUTPATIENT)
Dept: PHYSICAL THERAPY | Facility: CLINIC | Age: 88
End: 2023-06-19
Payer: MEDICARE

## 2023-06-19 ENCOUNTER — LAB (OUTPATIENT)
Dept: LAB | Facility: CLINIC | Age: 88
End: 2023-06-19
Payer: MEDICARE

## 2023-06-19 ENCOUNTER — OFFICE VISIT (OUTPATIENT)
Dept: NEUROLOGY | Facility: CLINIC | Age: 88
End: 2023-06-19
Payer: MEDICARE

## 2023-06-19 VITALS
HEART RATE: 91 BPM | RESPIRATION RATE: 16 BRPM | DIASTOLIC BLOOD PRESSURE: 71 MMHG | OXYGEN SATURATION: 97 % | SYSTOLIC BLOOD PRESSURE: 135 MMHG

## 2023-06-19 DIAGNOSIS — R53.82 CHRONIC FATIGUE, UNSPECIFIED: ICD-10-CM

## 2023-06-19 DIAGNOSIS — R26.89 BALANCE PROBLEMS: ICD-10-CM

## 2023-06-19 DIAGNOSIS — G70.00 MYASTHENIA GRAVIS WITHOUT EXACERBATION (H): Primary | ICD-10-CM

## 2023-06-19 DIAGNOSIS — R73.9 HYPERGLYCEMIA: ICD-10-CM

## 2023-06-19 DIAGNOSIS — G70.00 MYASTHENIA GRAVIS WITHOUT EXACERBATION (H): ICD-10-CM

## 2023-06-19 DIAGNOSIS — G62.9 PERIPHERAL POLYNEUROPATHY: ICD-10-CM

## 2023-06-19 LAB
ANION GAP SERPL CALCULATED.3IONS-SCNC: 9 MMOL/L (ref 7–15)
BASOPHILS # BLD AUTO: 0 10E3/UL (ref 0–0.2)
BASOPHILS NFR BLD AUTO: 0 %
BUN SERPL-MCNC: 27.3 MG/DL (ref 8–23)
CALCIUM SERPL-MCNC: 9.8 MG/DL (ref 8.8–10.2)
CHLORIDE SERPL-SCNC: 110 MMOL/L (ref 98–107)
CREAT SERPL-MCNC: 0.74 MG/DL (ref 0.67–1.17)
DEPRECATED HCO3 PLAS-SCNC: 27 MMOL/L (ref 22–29)
EOSINOPHIL # BLD AUTO: 0 10E3/UL (ref 0–0.7)
EOSINOPHIL NFR BLD AUTO: 0 %
ERYTHROCYTE [DISTWIDTH] IN BLOOD BY AUTOMATED COUNT: 13.6 % (ref 10–15)
GFR SERPL CREATININE-BSD FRML MDRD: 87 ML/MIN/1.73M2
GLUCOSE SERPL-MCNC: 114 MG/DL (ref 70–99)
HCT VFR BLD AUTO: 37.4 % (ref 40–53)
HGB BLD-MCNC: 12.3 G/DL (ref 13.3–17.7)
IMM GRANULOCYTES # BLD: 0 10E3/UL
IMM GRANULOCYTES NFR BLD: 0 %
LYMPHOCYTES # BLD AUTO: 0.9 10E3/UL (ref 0.8–5.3)
LYMPHOCYTES NFR BLD AUTO: 9 %
MCH RBC QN AUTO: 32.7 PG (ref 26.5–33)
MCHC RBC AUTO-ENTMCNC: 32.9 G/DL (ref 31.5–36.5)
MCV RBC AUTO: 100 FL (ref 78–100)
MONOCYTES # BLD AUTO: 0.6 10E3/UL (ref 0–1.3)
MONOCYTES NFR BLD AUTO: 7 %
NEUTROPHILS # BLD AUTO: 8.3 10E3/UL (ref 1.6–8.3)
NEUTROPHILS NFR BLD AUTO: 84 %
NRBC # BLD AUTO: 0 10E3/UL
NRBC BLD AUTO-RTO: 0 /100
PLATELET # BLD AUTO: 191 10E3/UL (ref 150–450)
POTASSIUM SERPL-SCNC: 4.4 MMOL/L (ref 3.4–5.3)
RBC # BLD AUTO: 3.76 10E6/UL (ref 4.4–5.9)
SODIUM SERPL-SCNC: 146 MMOL/L (ref 136–145)
TSH SERPL DL<=0.005 MIU/L-ACNC: 1.44 UIU/ML (ref 0.3–4.2)
WBC # BLD AUTO: 9.9 10E3/UL (ref 4–11)

## 2023-06-19 PROCEDURE — 86334 IMMUNOFIX E-PHORESIS SERUM: CPT | Performed by: PATHOLOGY

## 2023-06-19 PROCEDURE — 80048 BASIC METABOLIC PNL TOTAL CA: CPT | Performed by: PATHOLOGY

## 2023-06-19 PROCEDURE — 99214 OFFICE O/P EST MOD 30 MIN: CPT | Performed by: PSYCHIATRY & NEUROLOGY

## 2023-06-19 PROCEDURE — 84165 PROTEIN E-PHORESIS SERUM: CPT | Mod: TC | Performed by: PATHOLOGY

## 2023-06-19 PROCEDURE — 83036 HEMOGLOBIN GLYCOSYLATED A1C: CPT | Mod: 90 | Performed by: PATHOLOGY

## 2023-06-19 PROCEDURE — 85025 COMPLETE CBC W/AUTO DIFF WBC: CPT | Performed by: PATHOLOGY

## 2023-06-19 PROCEDURE — 84443 ASSAY THYROID STIM HORMONE: CPT | Performed by: PATHOLOGY

## 2023-06-19 PROCEDURE — 86334 IMMUNOFIX E-PHORESIS SERUM: CPT | Mod: 26

## 2023-06-19 PROCEDURE — 36415 COLL VENOUS BLD VENIPUNCTURE: CPT | Performed by: PATHOLOGY

## 2023-06-19 PROCEDURE — 99207 PR NO CHARGE LOS: CPT | Performed by: PHYSICAL THERAPIST

## 2023-06-19 PROCEDURE — 84155 ASSAY OF PROTEIN SERUM: CPT | Mod: 90 | Performed by: PATHOLOGY

## 2023-06-19 PROCEDURE — 82607 VITAMIN B-12: CPT | Mod: 90 | Performed by: PATHOLOGY

## 2023-06-19 PROCEDURE — 84165 PROTEIN E-PHORESIS SERUM: CPT | Mod: 26

## 2023-06-19 PROCEDURE — 83921 ORGANIC ACID SINGLE QUANT: CPT | Mod: 90 | Performed by: PATHOLOGY

## 2023-06-19 PROCEDURE — 99000 SPECIMEN HANDLING OFFICE-LAB: CPT | Performed by: PATHOLOGY

## 2023-06-19 RX ORDER — LIDOCAINE 4 G/G
1 PATCH TOPICAL
COMMUNITY
Start: 2023-05-03

## 2023-06-19 RX ORDER — DORZOLAMIDE HYDROCHLORIDE AND TIMOLOL MALEATE 20; 5 MG/ML; MG/ML
1 SOLUTION/ DROPS OPHTHALMIC
COMMUNITY
Start: 2023-05-03

## 2023-06-19 ASSESSMENT — PAIN SCALES - GENERAL: PAINLEVEL: NO PAIN (0)

## 2023-06-19 NOTE — PATIENT INSTRUCTIONS
Continue home exercise program through home care  Recommend trialing walking poles with home care therapists inside and outside your home  3. Recommend outpatient PT for balance retraining to get back to prior level of function and golfing      Tonya Espinosa PT, DPT  Physical Therapist  [facility] [address]  [address] [place] [address]  [email or web address]  Appointments: [phone]

## 2023-06-19 NOTE — LETTER
"6/19/2023       RE: Washington Abdi  [address]  [facility] [address]     Dear Colleague,    Thank you for referring your patient, Washington Abdi, to the [facility] NEUROLOGY CLINIC [place] [facility]. Please see a copy of my visit note below.     [facility]                  Muscular Dystrophy Clinic Note     Washington Abdi MRN# [id number]   YOB: 1933 Age: 89 year old      Date of Visit:  June 19, 2023     Primary care provider: Jose Lamb      History is obtained from the patient, family and medical record       Interval Change:      Washington Abdi is an 89 year old male was seen and examined at the muscular dystrophy clinic on June 19, 2023 for evaluation of primarily ocular myasthenia gravis.     He was last seen on 1/3/2022 at which time he was doing well. Since then, the patient continues to have rare diplopia when fatigued, but otherwise reports he's been doing well. He did have a fall ~8wks ago while going downstairs, causing some fractures in his ribs and \"groin\". However, he has been improving since then and plan to go golfing later in the summer. He currently is using a walker at times due to lingering soreness in his hips since the fall. He denies any issues with speech, swallowing, breathing, or chewing. He has noticed a bit more difficulty with rising from a sitting position. He is also having a bit more balance issues, but has not had another fall since the one a few weeks ago    He is currently taking Pyridostigmine 15-30mg as needed, Prednisone 5mg daily, and Azathioprine 50mg daily. The patient notes that he has seen advertisements for [employer] and wonders if this would be an option for him    MG Activities of Daily Living (MG-ADL) profile    Grade 0 1 2 3   Talking Normal Intermittent slurring/nasal speech Constant slurring/nasal speech, can be understood Difficult to understand   Chewing Normal " Fatigue with solid food Fatigue with soft food G tube   Swallowing Normal Rare choking Frequent choking necessitating diet changes G tube   Breathing Normal SOB with exertion SOB at rest Ventilator dependent   Ability to brush teeth/comb hair Normal Extra effort, no rest periods needed Rest periods needed Cannot do one of these   Ability to rise from chair Normal Mild impairment, uses arms sometimes Moderate impairment, always uses arms Severe impairment, requires assistance   Double vision None Present, not daily Daily, not constant Constant   Ptosis None Present, but not daily Daily, not constant Constant              Physical Exam:   There were no vitals taken for this visit.    Physical Exam:   General: NAD  Head: Normocephalic, atraumatic  Eyes: No conjunctival injection, no scleral icterus.  Mouth: No oral lesions, no erythema or exudate in the oropharynx  Respiratory: No increased work of breathing  Cardiovascular: No lower extremity edema  Abdomen: Soft, non-tender, without organomegaly.  Extremities: Warm, dry  Neurologic:             Mental Status Exam: Alert, awake and easily engaged in interaction.             Cranial Nerves: PERRLA, subtly dysconjugate gaze, EOMs intact, no nystagmus, facial movements symmetric, facial    sensation intact to light touch, hearing intact to conversation, palate and uvula rise symmetrically,    no deviation in uvula or tongue, tongue midline and fully mobile with no atrophy or fasciculations.              Motor: Normal tone in all four extremities, no atrophy or fasciculations. Neck flexion/extenion is 5/5     Manual Motor Exam    Right Left A F   Right Left A F   Shoulder Abduction 5 5     Hip Flexion 5    5       Elbow Flexion 5 5     Knee Extension 5 5       Elbow Extension 5 5     Knee Flexion 5 5       Wrist Extension 5 5     Foot Dorsiflexion 5 5       Wrist flexion 5 5     Foot Plantar Flexion 5 5          Reflexes    Right Left   Right Left   Biceps 2 2 Patellar 2  2   Triceps 2 2 Achilles Absent Absent   Brachioradialis 2 2 Babinski Absent Absent              Sensory: Neg Romberg with minor sway. Reduced vibration in bilateral legs             Gait:Normal          Allergies:     Allergies   Allergen Reactions    Aspirin      SUBDURAL HEMORRHAGES    Latex Rash             Medications:      Current Outpatient Medications   Medication    atenolol (TENORMIN) 25 MG tablet    azaTHIOprine (IMURAN) 50 MG tablet    Calcium Carbonate-Vit D-Min (CALTRATE 600+D PLUS PO)    Cholecalciferol (VITAMIN D3 PO)    cyanocolbalamin (VITAMIN B-12) 1000 MCG tablet    diltiazem ER (DILT-XR) 180 MG 24 hr capsule    Folic Acid 800 MCG TABS    Glucosamine-Chondroit-Vit C-Mn (GLUCOSAMINE CHONDROITIN COMPLX) TABS    latanoprost (XALATAN) 0.005 % ophthalmic solution    LOSARTAN POTASSIUM PO    Multiple Vitamin (MULTI-VITAMIN) per tablet    predniSONE (DELTASONE) 5 MG tablet    pyridostigmine (MESTINON) 60 MG tablet    WARFARIN SODIUM PO     No current facility-administered medications for this visit.             Data:     CBC RESULTS: Recent Labs   Lab Test 10/26/15  1630   WBC 9.3   RBC 3.83*   HGB 12.1*   HCT 36.8*   MCV 96   MCH 31.6   MCHC 32.9   RDW 13.5        Last Comprehensive Metabolic Panel:  Lab Results   Component Value Date     02/02/2010    POTASSIUM 4.0 02/02/2010    CHLORIDE 99 02/02/2010    CO2 33 (H) 02/02/2010    ANIONGAP 8 02/02/2010     (H) 02/02/2010    BUN 25 02/02/2010    CR 0.79 02/02/2010    GFRESTIMATED >90 02/02/2010    TYE 10.1 02/02/2010                  Assessment and Recommendations:    Washington Abdi is an 89 year old male presents with primarily ocular myasthenia gravis. The patient has been doing well and, despite a recent fall, remains with only minimal symptoms. We did discuss the patient's interest in Vyvgard, but I believe his myasthenia is not significant enough that he would get much benefit from this medication. As such, I'd recommend he continue  with his current therapies    In addition, on exam the patient was noted to have some increase difficulties with balance, swaying a bit on Romberg and decreased vibratory sensation in his bilateral feet. This could be a sign of neuropathy, which can result in sensory ataxia. For this, will perform a basic work-up for treatable causes     Recommendations:  -Continue Pyridostigmine 15-30mg as needed  -Continue Prednisone 5mg daily  -Continue Azathioprine 50mg daily  -Will obtain the following labs: CBC, BMP, HgbA1c, MMA, Vit B12, TSH, SPEP with immunofixation  -Physical Therapy evaluation for balance issues, today.  -Return to clinic in 12 months.      I have spent at least 30  min on the date of the encounter in face-to-face evaluation, chart review, patient visit, review of tests, counseling the patient, documentation about the issues documented above. See note for details.    Sincerely,        Dom Owens MD  Neurology and Neuromuscular medicine  [phone]

## 2023-06-19 NOTE — PROGRESS NOTES
" [facility]                  Muscular Dystrophy Clinic Note     Washington Abdi MRN# [id number]   YOB: 1933 Age: 89 year old      Date of Visit:  June 19, 2023     Primary care provider: Jose Lamb      History is obtained from the patient, family and medical record       Interval Change:      Washington Abdi is an 89 year old male was seen and examined at the muscular dystrophy clinic on June 19, 2023 for evaluation of primarily ocular myasthenia gravis.     He was last seen on 1/3/2022 at which time he was doing well. Since then, the patient continues to have rare diplopia when fatigued, but otherwise reports he's been doing well. He did have a fall ~8wks ago while going downstairs, causing some fractures in his ribs and \"groin\". However, he has been improving since then and plan to go golfing later in the summer. He currently is using a walker at times due to lingering soreness in his hips since the fall. He denies any issues with speech, swallowing, breathing, or chewing. He has noticed a bit more difficulty with rising from a sitting position. He is also having a bit more balance issues, but has not had another fall since the one a few weeks ago    He is currently taking Pyridostigmine 15-30mg as needed, Prednisone 5mg daily, and Azathioprine 50mg daily. The patient notes that he has seen advertisements for [employer] and wonders if this would be an option for him    MG Activities of Daily Living (MG-ADL) profile    Grade 0 1 2 3   Talking Normal Intermittent slurring/nasal speech Constant slurring/nasal speech, can be understood Difficult to understand   Chewing Normal Fatigue with solid food Fatigue with soft food G tube   Swallowing Normal Rare choking Frequent choking necessitating diet changes G tube   Breathing Normal SOB with exertion SOB at rest Ventilator dependent   Ability to brush teeth/comb hair Normal Extra effort, no rest periods needed Rest periods needed Cannot " do one of these   Ability to rise from chair Normal Mild impairment, uses arms sometimes Moderate impairment, always uses arms Severe impairment, requires assistance   Double vision None Present, not daily Daily, not constant Constant   Ptosis None Present, but not daily Daily, not constant Constant              Physical Exam:   There were no vitals taken for this visit.    Physical Exam:   General: NAD  Head: Normocephalic, atraumatic  Eyes: No conjunctival injection, no scleral icterus.  Mouth: No oral lesions, no erythema or exudate in the oropharynx  Respiratory: No increased work of breathing  Cardiovascular: No lower extremity edema  Abdomen: Soft, non-tender, without organomegaly.  Extremities: Warm, dry  Neurologic:             Mental Status Exam: Alert, awake and easily engaged in interaction.             Cranial Nerves: PERRLA, subtly dysconjugate gaze, EOMs intact, no nystagmus, facial movements symmetric, facial    sensation intact to light touch, hearing intact to conversation, palate and uvula rise symmetrically,    no deviation in uvula or tongue, tongue midline and fully mobile with no atrophy or fasciculations.              Motor: Normal tone in all four extremities, no atrophy or fasciculations. Neck flexion/extenion is 5/5     Manual Motor Exam    Right Left A F   Right Left A F   Shoulder Abduction 5 5     Hip Flexion 5    5       Elbow Flexion 5 5     Knee Extension 5 5       Elbow Extension 5 5     Knee Flexion 5 5       Wrist Extension 5 5     Foot Dorsiflexion 5 5       Wrist flexion 5 5     Foot Plantar Flexion 5 5          Reflexes    Right Left   Right Left   Biceps 2 2 Patellar 2 2   Triceps 2 2 Achilles Absent Absent   Brachioradialis 2 2 Babinski Absent Absent              Sensory: Neg Romberg with minor sway. Reduced vibration in bilateral legs             Gait:Normal          Allergies:     Allergies   Allergen Reactions     Aspirin      SUBDURAL HEMORRHAGES     Latex Rash              Medications:      Current Outpatient Medications   Medication     atenolol (TENORMIN) 25 MG tablet     azaTHIOprine (IMURAN) 50 MG tablet     Calcium Carbonate-Vit D-Min (CALTRATE 600+D PLUS PO)     Cholecalciferol (VITAMIN D3 PO)     cyanocolbalamin (VITAMIN B-12) 1000 MCG tablet     diltiazem ER (DILT-XR) 180 MG 24 hr capsule     Folic Acid 800 MCG TABS     Glucosamine-Chondroit-Vit C-Mn (GLUCOSAMINE CHONDROITIN COMPLX) TABS     latanoprost (XALATAN) 0.005 % ophthalmic solution     LOSARTAN POTASSIUM PO     Multiple Vitamin (MULTI-VITAMIN) per tablet     predniSONE (DELTASONE) 5 MG tablet     pyridostigmine (MESTINON) 60 MG tablet     WARFARIN SODIUM PO     No current facility-administered medications for this visit.             Data:     CBC RESULTS: Recent Labs   Lab Test 10/26/15  1630   WBC 9.3   RBC 3.83*   HGB 12.1*   HCT 36.8*   MCV 96   MCH 31.6   MCHC 32.9   RDW 13.5        Last Comprehensive Metabolic Panel:  Lab Results   Component Value Date     02/02/2010    POTASSIUM 4.0 02/02/2010    CHLORIDE 99 02/02/2010    CO2 33 (H) 02/02/2010    ANIONGAP 8 02/02/2010     (H) 02/02/2010    BUN 25 02/02/2010    CR 0.79 02/02/2010    GFRESTIMATED >90 02/02/2010    TYE 10.1 02/02/2010                  Assessment and Recommendations:    Washington Abdi is an 89 year old male presents with primarily ocular myasthenia gravis. The patient has been doing well and, despite a recent fall, remains with only minimal symptoms. We did discuss the patient's interest in Vyvgard, but I believe his myasthenia is not significant enough that he would get much benefit from this medication. As such, I'd recommend he continue with his current therapies    In addition, on exam the patient was noted to have some increase difficulties with balance, swaying a bit on Romberg and decreased vibratory sensation in his bilateral feet. This could be a sign of neuropathy, which can result in sensory ataxia. For this, will perform  a basic work-up for treatable causes     Recommendations:  -Continue Pyridostigmine 15-30mg as needed  -Continue Prednisone 5mg daily  -Continue Azathioprine 50mg daily  -Will obtain the following labs: CBC, BMP, HgbA1c, MMA, Vit B12, TSH, SPEP with immunofixation  -Physical Therapy evaluation for balance issues, today.  -Return to clinic in 12 months.      I have spent at least 30  min on the date of the encounter in face-to-face evaluation, chart review, patient visit, review of tests, counseling the patient, documentation about the issues documented above. See note for details.    Sincerely,        Dom Owens MD  Neurology and Neuromuscular medicine  [phone]    ADDENDUM:  Called the patient regarding a small peak of gamma fraction. Discussed it is likely of low clinical significance but consultation by a hematologist is prudent. He agreed with the plan.     Dom Owens MD

## 2023-06-19 NOTE — PROGRESS NOTES
Patient recently sustained fall about 8 weeks ago - was at TCU and now getting home care (PT and OT). PT present in clinic today; collaborated with patient on recommendations for transitioning to outpatient PT once home care is finished next week.     Gait speed: 0.48 m/s with WW    Total time with patient: 15 minutes  Treatment time: 0 minutes    Tonya Espinosa PT, DPT  Physical Therapist  [facility] - [address]  [address] [place] [address]  [email or web address]  Appointments: [phone]

## 2023-06-19 NOTE — PATIENT INSTRUCTIONS
-Continue Pyridostigmine 15-30mg as needed  -Continue Prednisone 5mg daily  -Continue Azathioprine 50mg daily  -Will obtain the following labs: CBC, BMP, HgbA1c, MMA, Vit B12, TSH, SPEP with immunofixation  -Physical Therapy for balance issues  -Return to clinic in 12 months. e

## 2023-06-20 LAB
HBA1C MFR BLD: 6 %
TOTAL PROTEIN SERUM FOR ELP: 6.7 G/DL (ref 6.4–8.3)
VIT B12 SERPL-MCNC: 616 PG/ML (ref 232–1245)

## 2023-06-21 LAB
ALBUMIN SERPL ELPH-MCNC: 4.1 G/DL (ref 3.7–5.1)
ALPHA1 GLOB SERPL ELPH-MCNC: 0.3 G/DL (ref 0.2–0.4)
ALPHA2 GLOB SERPL ELPH-MCNC: 0.7 G/DL (ref 0.5–0.9)
B-GLOBULIN SERPL ELPH-MCNC: 0.8 G/DL (ref 0.6–1)
GAMMA GLOB SERPL ELPH-MCNC: 0.8 G/DL (ref 0.7–1.6)
M PROTEIN SERPL ELPH-MCNC: 0.1 G/DL
PROT PATTERN SERPL ELPH-IMP: ABNORMAL
PROT PATTERN SERPL IFE-IMP: NORMAL

## 2023-06-22 LAB — METHYLMALONATE SERPL-SCNC: 0.34 UMOL/L (ref 0–0.4)

## 2023-07-10 ENCOUNTER — PATIENT OUTREACH (OUTPATIENT)
Dept: ONCOLOGY | Facility: CLINIC | Age: 88
End: 2023-07-10
Payer: MEDICARE

## 2023-07-10 DIAGNOSIS — R77.8 ABNORMAL SERUM PROTEIN ELECTROPHORESIS: Primary | ICD-10-CM

## 2023-07-10 NOTE — PROGRESS NOTES
New Patient Oncology Nurse Navigator Note     Referring provider: Dom Owens MD     Referring Clinic/Organization: [facility]     Referred to (specialty:) Hematology     Requested provider (if applicable): NA     Date Referral Received: July 10, 2023     Evaluation for:  R77.8 (ICD-10-CM) - Abnormal serum protein electrophoresis     Clinical History (per Nurse review of records provided):    Patient seen on 6/19/23 in the neurology clinic for follow up on ocular myasthenia gravis. He was noted to have some increase difficulties with balance ans swaying a bit on Rombert and decreased vibratory sensation in his bilateral feet. Could be a sign of neuropathy so basic work up was ordered including labs:     Latest Reference Range & Units 06/19/23 14:54   Sodium 136 - 145 mmol/L 146 (H)   Potassium 3.4 - 5.3 mmol/L 4.4   Chloride 98 - 107 mmol/L 110 (H)   Carbon Dioxide (CO2) 22 - 29 mmol/L 27   Urea Nitrogen 8.0 - 23.0 mg/dL 27.3 (H)   Creatinine 0.67 - 1.17 mg/dL 0.74   GFR Estimate >60 mL/min/1.73m2 87   Calcium 8.8 - 10.2 mg/dL 9.8   Anion Gap 7 - 15 mmol/L 9   Glucose 70 - 99 mg/dL 114 (H)   Hemoglobin A1C <5.7 % 6.0 (H)   Methylmalonic Acid 0.00 - 0.40 umol/L 0.34   TSH 0.30 - 4.20 uIU/mL 1.44   Vitamin B12 232 - 1,245 pg/mL 616   WBC 4.0 - 11.0 10e3/uL 9.9   Hemoglobin 13.3 - 17.7 g/dL 12.3 (L)   Hematocrit 40.0 - 53.0 % 37.4 (L)   Platelet Count 150 - 450 10e3/uL 191   RBC Count 4.40 - 5.90 10e6/uL 3.76 (L)   MCV 78 - 100 fL 100   MCH 26.5 - 33.0 pg 32.7   MCHC 31.5 - 36.5 g/dL 32.9   RDW 10.0 - 15.0 % 13.6   % Neutrophils % 84   % Lymphocytes % 9   % Monocytes % 7   % Eosinophils % 0   % Basophils % 0   Absolute Basophils 0.0 - 0.2 10e3/uL 0.0   Absolute Eosinophils 0.0 - 0.7 10e3/uL 0.0   Absolute Immature Granulocytes <=0.4 10e3/uL 0.0   Absolute Lymphocytes 0.8 - 5.3 10e3/uL 0.9   Absolute Monocytes 0.0 - 1.3 10e3/uL 0.6   % Immature  Granulocytes % 0   Absolute Neutrophils 1.6 - 8.3 10e3/uL 8.3   Absolute NRBCs 10e3/uL 0.0   NRBCs per 100 WBC <1 /100 0   Albumin Fraction 3.7 - 5.1 g/dL 4.1   Alpha 1 Fraction 0.2 - 0.4 g/dL 0.3   Alpha 2 Fraction 0.5 - 0.9 g/dL 0.7   Beta Fraction 0.6 - 1.0 g/dL 0.8   ELP Interpretation:  Small monoclonal protein (0.1 g/dL) seen in the gamma fraction. See immunofixation report on same specimen. Pathologic significance requires clinical correlation. Jose Alfredo So M.D., Ph.D., Pathologist.   Gamma Fraction 0.7 - 1.6 g/dL 0.8   Immunofixation ELP  Small monoclonal IgM  immunoglobulin of  lambda  light chain type. Very small monoclonal IgM  immunoglobulin of  kappa  light chain type. Pathologic significance requires clinical correlation. Jose Alfredo So M.D., Ph.D., Pathologist   Monoclonal Peak <=0.0 g/dL 0.1 (H)   Total Protein Serum for ELP 6.4 - 8.3 g/dL 6.7   (H): Data is abnormally high  (L): Data is abnormally low     Records Location: See Bookmarked material     Records Needed: NA     Additional testing needed prior to consult: NA    Payor: MEDICARE / Plan: MEDICARE / Product Type: Medicare /     July 10, 2023    Called patient to introduced myself and role as nurse navigator with [facility] Hematology/Oncology department and to inform them that we have received the referral for a diagnosis of abnormal serum protein electrophoresis  from Dr. Dom Owens  Patient confirms they are aware of the referral and ready to schedule. Provided them with contact information for NPS and encourage them to call with any questions. Patient verbalized understanding of plan. Transferred to NPS for scheduling     Gina Martinez, RN, BSN  [facility] Hematology/Oncology Nurse Navigator  [phone]

## 2023-08-21 DIAGNOSIS — G70.00 MYASTHENIA GRAVIS WITHOUT EXACERBATION (H): ICD-10-CM

## 2023-08-21 RX ORDER — PREDNISONE 5 MG/1
5 TABLET ORAL DAILY
Qty: 90 TABLET | Refills: 0 | Status: SHIPPED | OUTPATIENT
Start: 2023-08-21 | End: 2023-11-17

## 2023-09-07 ENCOUNTER — ONCOLOGY VISIT (OUTPATIENT)
Dept: ONCOLOGY | Facility: CLINIC | Age: 88
End: 2023-09-07
Attending: PSYCHIATRY & NEUROLOGY
Payer: MEDICARE

## 2023-09-07 ENCOUNTER — PRE VISIT (OUTPATIENT)
Dept: ONCOLOGY | Facility: CLINIC | Age: 88
End: 2023-09-07

## 2023-09-07 VITALS
WEIGHT: 156 LBS | OXYGEN SATURATION: 97 % | BODY MASS INDEX: 22.33 KG/M2 | HEART RATE: 64 BPM | SYSTOLIC BLOOD PRESSURE: 152 MMHG | HEIGHT: 70 IN | DIASTOLIC BLOOD PRESSURE: 82 MMHG

## 2023-09-07 DIAGNOSIS — R77.8 ABNORMAL SERUM PROTEIN ELECTROPHORESIS: ICD-10-CM

## 2023-09-07 LAB
ALBUMIN SERPL BCG-MCNC: 4.6 G/DL (ref 3.5–5.2)
ALP SERPL-CCNC: 73 U/L (ref 40–129)
ALT SERPL W P-5'-P-CCNC: 11 U/L (ref 0–70)
ANION GAP SERPL CALCULATED.3IONS-SCNC: 11 MMOL/L (ref 7–15)
AST SERPL W P-5'-P-CCNC: 20 U/L (ref 0–45)
BASOPHILS # BLD AUTO: 0 10E3/UL (ref 0–0.2)
BASOPHILS NFR BLD AUTO: 0 %
BILIRUB SERPL-MCNC: 0.4 MG/DL
BUN SERPL-MCNC: 18 MG/DL (ref 8–23)
CALCIUM SERPL-MCNC: 9.7 MG/DL (ref 8.2–9.6)
CHLORIDE SERPL-SCNC: 101 MMOL/L (ref 98–107)
CREAT SERPL-MCNC: 0.89 MG/DL (ref 0.67–1.17)
DEPRECATED HCO3 PLAS-SCNC: 27 MMOL/L (ref 22–29)
EGFRCR SERPLBLD CKD-EPI 2021: 81 ML/MIN/1.73M2
EOSINOPHIL # BLD AUTO: 0 10E3/UL (ref 0–0.7)
EOSINOPHIL NFR BLD AUTO: 0 %
ERYTHROCYTE [DISTWIDTH] IN BLOOD BY AUTOMATED COUNT: 13.5 % (ref 10–15)
GLUCOSE SERPL-MCNC: 168 MG/DL (ref 70–99)
HCT VFR BLD AUTO: 36.3 % (ref 40–53)
HGB BLD-MCNC: 12.2 G/DL (ref 13.3–17.7)
IMM GRANULOCYTES # BLD: 0 10E3/UL
IMM GRANULOCYTES NFR BLD: 0 %
LDH SERPL L TO P-CCNC: 223 U/L (ref 0–250)
LYMPHOCYTES # BLD AUTO: 0.7 10E3/UL (ref 0.8–5.3)
LYMPHOCYTES NFR BLD AUTO: 7 %
MCH RBC QN AUTO: 32.6 PG (ref 26.5–33)
MCHC RBC AUTO-ENTMCNC: 33.6 G/DL (ref 31.5–36.5)
MCV RBC AUTO: 97 FL (ref 78–100)
MONOCYTES # BLD AUTO: 0.5 10E3/UL (ref 0–1.3)
MONOCYTES NFR BLD AUTO: 5 %
NEUTROPHILS # BLD AUTO: 8.1 10E3/UL (ref 1.6–8.3)
NEUTROPHILS NFR BLD AUTO: 88 %
NRBC # BLD AUTO: 0 10E3/UL
NRBC BLD AUTO-RTO: 0 /100
PLATELET # BLD AUTO: 202 10E3/UL (ref 150–450)
POTASSIUM SERPL-SCNC: 4.3 MMOL/L (ref 3.4–5.3)
PROT SERPL-MCNC: 7.4 G/DL (ref 6.4–8.3)
RBC # BLD AUTO: 3.74 10E6/UL (ref 4.4–5.9)
SODIUM SERPL-SCNC: 139 MMOL/L (ref 136–145)
TOTAL PROTEIN SERUM FOR ELP: 6.8 G/DL (ref 6.4–8.3)
WBC # BLD AUTO: 9.3 10E3/UL (ref 4–11)

## 2023-09-07 PROCEDURE — 84155 ASSAY OF PROTEIN SERUM: CPT | Mod: 59 | Performed by: INTERNAL MEDICINE

## 2023-09-07 PROCEDURE — 85025 COMPLETE CBC W/AUTO DIFF WBC: CPT | Performed by: INTERNAL MEDICINE

## 2023-09-07 PROCEDURE — 83521 IG LIGHT CHAINS FREE EACH: CPT | Performed by: INTERNAL MEDICINE

## 2023-09-07 PROCEDURE — 82784 ASSAY IGA/IGD/IGG/IGM EACH: CPT | Performed by: INTERNAL MEDICINE

## 2023-09-07 PROCEDURE — 99205 OFFICE O/P NEW HI 60 MIN: CPT | Performed by: INTERNAL MEDICINE

## 2023-09-07 PROCEDURE — 82232 ASSAY OF BETA-2 PROTEIN: CPT | Performed by: INTERNAL MEDICINE

## 2023-09-07 PROCEDURE — 84165 PROTEIN E-PHORESIS SERUM: CPT

## 2023-09-07 PROCEDURE — 36415 COLL VENOUS BLD VENIPUNCTURE: CPT | Performed by: INTERNAL MEDICINE

## 2023-09-07 PROCEDURE — 86334 IMMUNOFIX E-PHORESIS SERUM: CPT

## 2023-09-07 PROCEDURE — 80053 COMPREHEN METABOLIC PANEL: CPT | Performed by: INTERNAL MEDICINE

## 2023-09-07 PROCEDURE — 83615 LACTATE (LD) (LDH) ENZYME: CPT | Performed by: INTERNAL MEDICINE

## 2023-09-07 ASSESSMENT — PAIN SCALES - GENERAL: PAINLEVEL: NO PAIN (0)

## 2023-09-07 NOTE — PROGRESS NOTES
[facility] CANCER CLINIC    NEW PATIENT VISIT NOTE    PATIENT NAME: Washington Abdi MRN # [id number]  DATE OF VISIT: September 7, 2023 YOB: 1933    REFERRING PROVIDER: Dom Owens MD  [address] [facility] [id number]  [place] [address]          HISTORY OF PRESENT ILLNESS   Washington Abdi is 89 year old male who has been referred for his MGUS     Washington is here with his wife. He notes that he has myasthenia gravis. He follows Dr. Owens in neurology at [place] and he has been referred to hematology for abnormal labs with elevated protein. He had a routine annual visit where the blood was checked and was positive for abnormal protein prompting the referral.     He had polio as 6 yrs old and affected his left side. He has post polio syndrome. He also had colon cancer and bowel resection at [place]. His myasthenia was getting worse and had worsening anemia. He was worked up with colonoscopy and ended up needing 16 inches of colon removed for colon cancer.     He has atrial fibrillation and pacemaker.      PAST MEDICAL HISTORY     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    left foot drop    Polymyalgia rheumatica (H)     Prostate cancer (H)     s/p radiation    Syncope     DUE TO BRAYCARDIA          CURRENT OUTPATIENT MEDICATIONS     Current Outpatient Medications   Medication Sig    atenolol (TENORMIN) 25 MG tablet Take 1 tablet by mouth daily.    azaTHIOprine (IMURAN) 50 MG tablet TAKE 1 TABLET(50 MG) BY MOUTH DAILY    Calcium Carbonate-Vit D-Min (CALTRATE 600+D PLUS PO) Take 1 tablet by mouth 2 times daily    Cholecalciferol  "(VITAMIN D3 PO) Take 2,000 Units by mouth daily    cyanocolbalamin (VITAMIN B-12) 1000 MCG tablet Take 1 tablet by mouth daily. As directed    diltiazem ER (DILT-XR) 180 MG 24 hr capsule Take 180 mg by mouth daily    dorzolamide-timolol (COSOPT) 2-0.5 % ophthalmic solution 1 drop    Folic Acid 800 MCG TABS Take 2 tablets by mouth daily.    Glucosamine-Chondroit-Vit C-Mn (GLUCOSAMINE CHONDROITIN COMPLX) TABS Take 1 tablet by mouth daily.    latanoprost (XALATAN) 0.005 % ophthalmic solution Place 1 drop into both eyes At Bedtime    Lidocaine (LIDOCARE) 4 % Patch Place 1 patch onto the skin    LOSARTAN POTASSIUM PO Take 25 mg by mouth daily    Multiple Vitamin (MULTI-VITAMIN) per tablet Take 1 tablet by mouth daily.    predniSONE (DELTASONE) 5 MG tablet Take 1 tablet (5 mg) by mouth daily    pyridostigmine (MESTINON) 60 MG tablet Take 0.5 tablets (30 mg) by mouth 3 times daily Or as needed before activities    WARFARIN SODIUM PO Take by mouth daily     No current facility-administered medications for this visit.        ALLERGIES      Allergies   Allergen Reactions    Aspirin      SUBDURAL HEMORRHAGES    Latex Rash        SOCIAL HISTORY    He is  and lives with his wife for over 50 yrs. He is retired dentist.     He does not smoke. He drinks cocktail for dinner. He denies problems with alcohol or drug abuse.      FAMILY HISTORY   - Mother had cancer - breast cancer, uterine cancer and bowel cancer; she never needed a drug for any of these  - Mother  of congestive heart failure at 88 yrs of age  - Father  young at 62 yrs of age with hemosiderosis / hemochromatosis.      REVIEW OF SYSTEMS   As above in the HPI, o/w complete 12-point ROS was negative.     PHYSICAL EXAM   BP (!) 152/82 (BP Location: Left arm, Patient Position: Chair, Cuff Size: Adult Regular)   Pulse 64   Ht 1.778 m (5' 10\")   Wt 70.8 kg (156 lb)   SpO2 97%   BMI 22.38 kg/m     Wt Readings from Last 3 Encounters:   23 70.8 kg (156 " lb)   11/18/19 71.8 kg (158 lb 6.4 oz)   10/15/18 75 kg (165 lb 6.4 oz)     GEN: NAD  HEENT: PERRL, EOMI, no icterus, injection or pallor. Oropharynx is clear.  NECK: no cervical or supraclavicular lymphadenopathy  LUNGS: clear bilaterally  CV: regular, no murmurs, rubs, or gallops  ABDOMEN: soft, non-tender, non-distended, normal bowel sounds, no hepatosplenomegaly by percussion or palpation  EXT: warm, well perfused, no edema  NEURO: alert  SKIN: no rashes     LABORATORY AND IMAGING STUDIES     Recent Labs   Lab Test 06/19/23  1454   *   POTASSIUM 4.4   CHLORIDE 110*   CO2 27   ANIONGAP 9   BUN 27.3*   CR 0.74   *   TYE 9.8     No results for input(s): MAG, PHOS in the last 52075 hours.  Recent Labs   Lab Test 06/19/23  1454 10/26/15  1630   WBC 9.9 9.3   HGB 12.3* 12.1*    216    96   NEUTROPHIL 84 85.0     Recent Labs   Lab Test 10/03/16  1135 10/26/15  1630 07/07/15  1405   BILITOTAL  --   --  0.4   ALKPHOS  --   --  96   ALT 16 21 22   AST 8 12 15   ALBUMIN  --   --  4.0     TSH   Date Value Ref Range Status   06/19/2023 1.44 0.30 - 4.20 uIU/mL Final   08/29/2005 1.79 0.4 - 5.0 mU/L Final   05/27/2005 1.60 0.4 - 5.0 mU/L Final   02/28/2005 0.89 0.4 - 5.0 mU/L Final     No results for input(s): CEA in the last 58942 hours.  No results found for this or any previous visit.    Lab Results   Component Value Date    PATH  01/30/2017     Patient Name: PHILIP MIRANDA  MR#: [id number]  Specimen #: [id number]  Collected: 1/30/2017  Received: 1/30/2017  Reported: 2/1/2017 18:10  Ordering Phy(s): ALFREDO OQUENDO    For improved result formatting, select 'View Enhanced Report Format'  under Linked Documents section.    SPECIMEN(S):  Colon polyp, descending    FINAL DIAGNOSIS:  LARGE INTESTINE, DESCENDING COLON, POLYPECTOMY X 4:  - Tubular adenomas  - No evidence of high grade dysplasia    I have personally reviewed all specimens and or slides, including the  listed special stains, and used them  "with my medical judgement to  determine the final diagnosis.    Electronically signed out by:    Martha Gloria M.D., Physicians    CLINICAL HISTORY:  83 year old man with a history of colon cancer.    GROSS:  The specimen is received in formalin with proper patient identification,  labeled \"descending colon polyps\".  The specimen consists of nine  tan-pink soft tissue fragments that range from 0.1-0.6 cm in greatest  dimension.  The specimen is entirely submitted in cassette 1. (Dictated  by: Maris Mitchell 1/30/2017 02:32 PM)    MICROSCOPIC:  Microscopic examination was performed.    CPT Codes:  A: 40023-AB1    TESTING LAB LOCATION:  [facility], [address]   [id number] [phone]    COLLECTION SITE:  Client: [facility]  Location: [facility] (B)           Latest Reference Range & Units 06/19/23 14:54 09/07/23 15:07   ELP Interpretation:  Small monoclonal protein (0.1 g/dL) seen in the gamma fraction. See immunofixation report on same specimen. Pathologic significance requires clinical correlation. Jose Alfredo So M.D., Ph.D., Pathologist. Very small monoclonal protein (about 0.1 g/dL) seen in the mid-gamma fraction. See immunofixation report on same specimen. The beta fraction migrating monoclonal is likely comigrating with C3 complement and not visible by capillary electrophoresis.    Gamma Fraction 0.7 - 1.6 g/dL 0.8 0.8   IGA 84 - 499 mg/dL  299    - 1,616 mg/dL  822   IGM 35 - 242 mg/dL  117   Immunofixation ELP  Small monoclonal IgM  immunoglobulin of  lambda  light chain type. Very small monoclonal IgM  immunoglobulin of  kappa  light chain type.   Very small monoclonal IgM immunoglobulin of lambda light chain type in the beta fraction and a second very small monoclonal IgM immunoglobulin of kappa light chain type in the mid-gamma fraction.     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 Abdi in presence of his family. I reviewed the finding of his new monoclonal gammopathy. This is considered a pre-malignant condition involving the plasma cells. I reviewed the 3 involved entities including MGUS, smoldering multiple myeloma(MM) and MM. Clearly MM is a serious malignancy involving the plasma cell and has aggressive course. he barely has a very small spike of 0.1 g/dl  -  monoclonal protein seen in the mid-gamma fraction.   This has been identified incidentally as is usually the case. He has mild peripheral neuropathy which could be related to his diabetes mellitus type II or his other comborbidities. He does not have peripheral neuropathy, vasculitis, hemolytic anemia, skin rashes, hypercalcemia which are often seen with MM.   I reviewed some additional tests that could be done as a part of workup including the free light chain assay, beta 2 microglobulin, gamma globulin subsets, urinary protein electrophoresis. While often a bone marrow biopsy is done, it is not mandated given the small spike in abnormal protein.     We could follow him serially with monoclonal spike and work up more aggressively if there was suddenly a rapid increase. The likelihood of having this disease remains small.       PLAN   I would complete some additional work up for his MGUS  Serial follow up of monoclonal spike can be considered,   I would set up a follow up appointment once the above test results are available.   I will personally see him in a year to review results.    Over 60 min of direct face  to face time spent with patient with more than 50% time spent in counseling and coordinating care.     Clive Barragan MD    Hematology, Oncology and Transplantation

## 2023-09-07 NOTE — NURSING NOTE
"Oncology Rooming Note    September 7, 2023 2:04 PM   Washington Abdi is a 89 year old male who presents for:    Chief Complaint   Patient presents with    Oncology Clinic Visit     New Abnormal Serum Protein Electrophoresis     Initial Vitals: BP (!) 152/82 (BP Location: Left arm, Patient Position: Chair, Cuff Size: Adult Regular)   Pulse 64   Ht 1.778 m (5' 10\")   Wt 70.8 kg (156 lb)   SpO2 97%   BMI 22.38 kg/m   Estimated body mass index is 22.38 kg/m  as calculated from the following:    Height as of this encounter: 1.778 m (5' 10\").    Weight as of this encounter: 70.8 kg (156 lb). Body surface area is 1.87 meters squared.  No Pain (0) Comment: Data Unavailable   No LMP for male patient.  Allergies reviewed: Yes  Medications reviewed: Yes    Medications: Medication refills not needed today.  Pharmacy name entered into EPIC:    [facility] - [facility] [address] [place] [address] PHARMACY 9324 - [facility] [address] [place] [address] DRUG STORE #[id number] [facility] [address] [facility] [address]    Clinical concerns: New Patient  Dr. Barragan was notified.      Brittany Justice CMA              "

## 2023-09-07 NOTE — LETTER
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 He notes that he has myasthenia gravis. He follows Dr. Owens in neurology at [facility] and he has been referred to hematology for abnormal labs with elevated protein. He had a routine annual visit where the blood was checked and was positive for abnormal protein prompting the referral.     He had polio as 6 yrs old and affected his left side. He has post polio syndrome. He also had colon cancer and bowel resection at [facility]. His myasthenia was getting worse and had worsening anemia. He was worked up with colonoscopy and ended up needing 16 inches of colon removed for colon cancer.     He has atrial fibrillation and pacemaker.      PAST MEDICAL HISTORY     Past Medical History:   Diagnosis Date     Basal cell carcinoma of scalp      Bradycardia      Colon cancer (H) 1990    s/p resection     Diabetes (H)      DJD (degenerative joint disease)      Glaucoma      History of shingles 2008    RIGHT FLANK     Hypertension      Myasthenia gravis without exacerbation (H) 1990     Pacemaker      Polio     AT AGE 6, LEFT LEG ATROPHY     Poliomyelitis 5 yo    left foot drop     Polymyalgia rheumatica (H)      Prostate cancer (H)     s/p radiation     Syncope     DUE TO BRAYCARDIA          CURRENT OUTPATIENT MEDICATIONS     Current Outpatient Medications   Medication Sig     atenolol (TENORMIN) 25 MG tablet Take 1 tablet by mouth daily.     azaTHIOprine (IMURAN) 50 MG tablet TAKE 1 TABLET(50 MG) BY MOUTH DAILY     Calcium  "Carbonate-Vit D-Min (CALTRATE 600+D PLUS PO) Take 1 tablet by mouth 2 times daily     Cholecalciferol (VITAMIN D3 PO) Take 2,000 Units by mouth daily     cyanocolbalamin (VITAMIN B-12) 1000 MCG tablet Take 1 tablet by mouth daily. As directed     diltiazem ER (DILT-XR) 180 MG 24 hr capsule Take 180 mg by mouth daily     dorzolamide-timolol (COSOPT) 2-0.5 % ophthalmic solution 1 drop     Folic Acid 800 MCG TABS Take 2 tablets by mouth daily.     Glucosamine-Chondroit-Vit C-Mn (GLUCOSAMINE CHONDROITIN COMPLX) TABS Take 1 tablet by mouth daily.     latanoprost (XALATAN) 0.005 % ophthalmic solution Place 1 drop into both eyes At Bedtime     Lidocaine (LIDOCARE) 4 % Patch Place 1 patch onto the skin     LOSARTAN POTASSIUM PO Take 25 mg by mouth daily     Multiple Vitamin (MULTI-VITAMIN) per tablet Take 1 tablet by mouth daily.     predniSONE (DELTASONE) 5 MG tablet Take 1 tablet (5 mg) by mouth daily     pyridostigmine (MESTINON) 60 MG tablet Take 0.5 tablets (30 mg) by mouth 3 times daily Or as needed before activities     WARFARIN SODIUM PO Take by mouth daily     No current facility-administered medications for this visit.        ALLERGIES      Allergies   Allergen Reactions     Aspirin      SUBDURAL HEMORRHAGES     Latex Rash        SOCIAL HISTORY    He is  and lives with his wife for over 50 yrs. He is retired dentist.     He does not smoke. He drinks cocktail for dinner. He denies problems with alcohol or drug abuse.      FAMILY HISTORY   - Mother had cancer - breast cancer, uterine cancer and bowel cancer; she never needed a drug for any of these  - Mother  of congestive heart failure at 88 yrs of age  - Father  young at 62 yrs of age with hemosiderosis / hemochromatosis.      REVIEW OF SYSTEMS   As above in the HPI, o/w complete 12-point ROS was negative.     PHYSICAL EXAM   BP (!) 152/82 (BP Location: Left arm, Patient Position: Chair, Cuff Size: Adult Regular)   Pulse 64   Ht 1.778 m (5' 10\")  "  Wt 70.8 kg (156 lb)   SpO2 97%   BMI 22.38 kg/m     Wt Readings from Last 3 Encounters:   09/07/23 70.8 kg (156 lb)   11/18/19 71.8 kg (158 lb 6.4 oz)   10/15/18 75 kg (165 lb 6.4 oz)     GEN: NAD  HEENT: PERRL, EOMI, no icterus, injection or pallor. Oropharynx is clear.  NECK: no cervical or supraclavicular lymphadenopathy  LUNGS: clear bilaterally  CV: regular, no murmurs, rubs, or gallops  ABDOMEN: soft, non-tender, non-distended, normal bowel sounds, no hepatosplenomegaly by percussion or palpation  EXT: warm, well perfused, no edema  NEURO: alert  SKIN: no rashes     LABORATORY AND IMAGING STUDIES     Recent Labs   Lab Test 06/19/23  1454   *   POTASSIUM 4.4   CHLORIDE 110*   CO2 27   ANIONGAP 9   BUN 27.3*   CR 0.74   *   TYE 9.8     No results for input(s): MAG, PHOS in the last 48865 hours.  Recent Labs   Lab Test 06/19/23  1454 10/26/15  1630   WBC 9.9 9.3   HGB 12.3* 12.1*    216    96   NEUTROPHIL 84 85.0     Recent Labs   Lab Test 10/03/16  1135 10/26/15  1630 07/07/15  1405   BILITOTAL  --   --  0.4   ALKPHOS  --   --  96   ALT 16 21 22   AST 8 12 15   ALBUMIN  --   --  4.0     TSH   Date Value Ref Range Status   06/19/2023 1.44 0.30 - 4.20 uIU/mL Final   08/29/2005 1.79 0.4 - 5.0 mU/L Final   05/27/2005 1.60 0.4 - 5.0 mU/L Final   02/28/2005 0.89 0.4 - 5.0 mU/L Final     No results for input(s): CEA in the last 57127 hours.  No results found for this or any previous visit.    Lab Results   Component Value Date    PATH  01/30/2017     Patient Name: PHILIP MIRANDA  MR#: [id number]  Specimen #: [id number]  Collected: 1/30/2017  Received: 1/30/2017  Reported: 2/1/2017 18:10  Ordering Phy(s): ALFREDO OQUENDO    For improved result formatting, select 'View Enhanced Report Format'  under Linked Documents section.    SPECIMEN(S):  Colon polyp, descending    FINAL DIAGNOSIS:  LARGE INTESTINE, DESCENDING COLON, POLYPECTOMY X 4:  - Tubular adenomas  - No evidence of high grade  "dysplasia    I have personally reviewed all specimens and or slides, including the  listed special stains, and used them with my medical judgement to  determine the final diagnosis.    Electronically signed out by:    Martha Gloria M.D., [facility]    CLINICAL HISTORY:  83 year old man with a history of colon cancer.    GROSS:  The specimen is received in formalin with proper patient identification,  labeled \"descending colon polyps\".  The specimen consists of nine  tan-pink soft tissue fragments that range from 0.1-0.6 cm in greatest  dimension.  The specimen is entirely submitted in cassette 1. (Dictated  by: Maris Mitchell 1/30/2017 02:32 PM)    MICROSCOPIC:  Microscopic examination was performed.    CPT Codes:  A: 86727-AG6    TESTING LAB LOCATION:  [facility], [address]   [address]  [phone]    COLLECTION SITE:  Client: [facility]  Location: [facility] ()           Latest Reference Range & Units 06/19/23 14:54 09/07/23 15:07   ELP Interpretation:  Small monoclonal protein (0.1 g/dL) seen in the gamma fraction. See immunofixation report on same specimen. Pathologic significance requires clinical correlation. Jose Alfredo So M.D., Ph.D., Pathologist. Very small monoclonal protein (about 0.1 g/dL) seen in the mid-gamma fraction. See immunofixation report on same specimen. The beta fraction migrating monoclonal is likely comigrating with C3 complement and not visible by capillary electrophoresis.    Gamma Fraction 0.7 - 1.6 g/dL 0.8 0.8   IGA 84 - 499 mg/dL  299    - 1,616 mg/dL  822   IGM 35 - 242 mg/dL  117   Immunofixation ELP  Small monoclonal IgM  immunoglobulin of  lambda  light chain type. Very small monoclonal IgM  immunoglobulin of  kappa  light chain type.   Very small monoclonal IgM immunoglobulin of lambda light chain type in the beta fraction and a second very " small monoclonal IgM immunoglobulin of kappa light chain type in the mid-gamma fraction.     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 Abdi in presence of his family. I reviewed the finding of his new monoclonal gammopathy. This is considered a pre-malignant condition involving the plasma cells. I reviewed the 3 involved entities including MGUS, smoldering multiple myeloma(MM) and MM. Clearly MM is a serious malignancy involving the plasma cell and has aggressive course. he barely has a very small spike of 0.1 g/dl  -  monoclonal protein seen in the mid-gamma fraction.   This has been identified incidentally as is usually the case. He has mild peripheral neuropathy which could be related to his diabetes mellitus type II or his other comborbidities. He does not have peripheral neuropathy, vasculitis, hemolytic anemia, skin rashes, hypercalcemia which are often seen with MM.   I reviewed some additional tests that could be done as a part of workup including the free light chain assay, beta 2 microglobulin, gamma globulin subsets, urinary protein electrophoresis. While often a bone marrow biopsy is done, it is not mandated given the small spike in abnormal protein.     We could follow him serially with monoclonal spike and work up more aggressively if there was suddenly a rapid increase. The likelihood of having this disease remains small.       PLAN   I would complete some additional work up for his MGUS  Serial follow up of monoclonal spike can be considered,   I would set up a follow up appointment once the  above test results are available.   I will personally see him in a year to review results.    Over 60 min of direct face to face time spent with patient with more than 50% time spent in counseling and coordinating care.     Clive Barragan MD    Hematology, Oncology and Transplantation           Clive Barragan MD

## 2023-09-07 NOTE — LETTER
9/7/2023         RE: Washington Abdi  [address]  [facility] [address]        Dear Colleague,    Thank you for referring your patient, Washington Abdi, to the [facility] [place]. Please see a copy of my visit note below.    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 He notes that he has myasthenia gravis. He follows Dr. Owens in neurology at [facility] and he has been referred to hematology for abnormal labs with elevated protein. He had a routine annual visit where the blood was checked and was positive for abnormal protein prompting the referral.     He had polio as 6 yrs old and affected his left side. He has post polio syndrome. He also had colon cancer and bowel resection at [facility]. His myasthenia was getting worse and had worsening anemia. He was worked up with colonoscopy and ended up needing 16 inches of colon removed for colon cancer.     He has atrial fibrillation and pacemaker.      PAST MEDICAL HISTORY     Past Medical History:   Diagnosis Date     Basal cell carcinoma of scalp      Bradycardia      Colon cancer (H) 1990    s/p resection     Diabetes (H)      DJD (degenerative joint disease)      Glaucoma      History of shingles 2008    RIGHT FLANK     Hypertension      Myasthenia gravis without exacerbation (H) 1990     Pacemaker      Polio     AT AGE 6, LEFT LEG ATROPHY     Poliomyelitis 7 yo    left foot drop     Polymyalgia rheumatica (H)      Prostate cancer (H)     s/p radiation     Syncope     DUE TO BRAYCARDIA          CURRENT OUTPATIENT MEDICATIONS     Current Outpatient Medications    Medication Sig     atenolol (TENORMIN) 25 MG tablet Take 1 tablet by mouth daily.     azaTHIOprine (IMURAN) 50 MG tablet TAKE 1 TABLET(50 MG) BY MOUTH DAILY     Calcium Carbonate-Vit D-Min (CALTRATE 600+D PLUS PO) Take 1 tablet by mouth 2 times daily     Cholecalciferol (VITAMIN D3 PO) Take 2,000 Units by mouth daily     cyanocolbalamin (VITAMIN B-12) 1000 MCG tablet Take 1 tablet by mouth daily. As directed     diltiazem ER (DILT-XR) 180 MG 24 hr capsule Take 180 mg by mouth daily     dorzolamide-timolol (COSOPT) 2-0.5 % ophthalmic solution 1 drop     Folic Acid 800 MCG TABS Take 2 tablets by mouth daily.     Glucosamine-Chondroit-Vit C-Mn (GLUCOSAMINE CHONDROITIN COMPLX) TABS Take 1 tablet by mouth daily.     latanoprost (XALATAN) 0.005 % ophthalmic solution Place 1 drop into both eyes At Bedtime     Lidocaine (LIDOCARE) 4 % Patch Place 1 patch onto the skin     LOSARTAN POTASSIUM PO Take 25 mg by mouth daily     Multiple Vitamin (MULTI-VITAMIN) per tablet Take 1 tablet by mouth daily.     predniSONE (DELTASONE) 5 MG tablet Take 1 tablet (5 mg) by mouth daily     pyridostigmine (MESTINON) 60 MG tablet Take 0.5 tablets (30 mg) by mouth 3 times daily Or as needed before activities     WARFARIN SODIUM PO Take by mouth daily     No current facility-administered medications for this visit.        ALLERGIES      Allergies   Allergen Reactions     Aspirin      SUBDURAL HEMORRHAGES     Latex Rash        SOCIAL HISTORY    He is  and lives with his wife for over 50 yrs. He is retired dentist.     He does not smoke. He drinks cocktail for dinner. He denies problems with alcohol or drug abuse.      FAMILY HISTORY   - Mother had cancer - breast cancer, uterine cancer and bowel cancer; she never needed a drug for any of these  - Mother  of congestive heart failure at 88 yrs of age  - Father  young at 62 yrs of age with hemosiderosis / hemochromatosis.      REVIEW OF SYSTEMS   As above in the HPI, o/w complete  "12-point ROS was negative.     PHYSICAL EXAM   BP (!) 152/82 (BP Location: Left arm, Patient Position: Chair, Cuff Size: Adult Regular)   Pulse 64   Ht 1.778 m (5' 10\")   Wt 70.8 kg (156 lb)   SpO2 97%   BMI 22.38 kg/m     Wt Readings from Last 3 Encounters:   09/07/23 70.8 kg (156 lb)   11/18/19 71.8 kg (158 lb 6.4 oz)   10/15/18 75 kg (165 lb 6.4 oz)     GEN: NAD  HEENT: PERRL, EOMI, no icterus, injection or pallor. Oropharynx is clear.  NECK: no cervical or supraclavicular lymphadenopathy  LUNGS: clear bilaterally  CV: regular, no murmurs, rubs, or gallops  ABDOMEN: soft, non-tender, non-distended, normal bowel sounds, no hepatosplenomegaly by percussion or palpation  EXT: warm, well perfused, no edema  NEURO: alert  SKIN: no rashes     LABORATORY AND IMAGING STUDIES     Recent Labs   Lab Test 06/19/23  1454   *   POTASSIUM 4.4   CHLORIDE 110*   CO2 27   ANIONGAP 9   BUN 27.3*   CR 0.74   *   TYE 9.8     No results for input(s): MAG, PHOS in the last 41390 hours.  Recent Labs   Lab Test 06/19/23  1454 10/26/15  1630   WBC 9.9 9.3   HGB 12.3* 12.1*    216    96   NEUTROPHIL 84 85.0     Recent Labs   Lab Test 10/03/16  1135 10/26/15  1630 07/07/15  1405   BILITOTAL  --   --  0.4   ALKPHOS  --   --  96   ALT 16 21 22   AST 8 12 15   ALBUMIN  --   --  4.0     TSH   Date Value Ref Range Status   06/19/2023 1.44 0.30 - 4.20 uIU/mL Final   08/29/2005 1.79 0.4 - 5.0 mU/L Final   05/27/2005 1.60 0.4 - 5.0 mU/L Final   02/28/2005 0.89 0.4 - 5.0 mU/L Final     No results for input(s): CEA in the last 23913 hours.  No results found for this or any previous visit.    Lab Results   Component Value Date    PATH  01/30/2017     Patient Name: PHILIP MIRANDA  MR#: [id number]  Specimen #: [id number]  Collected: 1/30/2017  Received: 1/30/2017  Reported: 2/1/2017 18:10  Ordering Phy(s): ALFREDO OQUENDO    For improved result formatting, select 'View Enhanced Report Format'  under Linked Documents " "section.    SPECIMEN(S):  Colon polyp, descending    FINAL DIAGNOSIS:  LARGE INTESTINE, DESCENDING COLON, POLYPECTOMY X 4:  - Tubular adenomas  - No evidence of high grade dysplasia    I have personally reviewed all specimens and or slides, including the  listed special stains, and used them with my medical judgement to  determine the final diagnosis.    Electronically signed out by:    Martha Gloria M.D., [facility]    CLINICAL HISTORY:  83 year old man with a history of colon cancer.    GROSS:  The specimen is received in formalin with proper patient identification,  labeled \"descending colon polyps\".  The specimen consists of nine  tan-pink soft tissue fragments that range from 0.1-0.6 cm in greatest  dimension.  The specimen is entirely submitted in cassette 1. (Dictated  by: Maris Mitchell 1/30/2017 02:32 PM)    MICROSCOPIC:  Microscopic examination was performed.    CPT Codes:  A: 30775-ZT8    TESTING LAB LOCATION:  [facility], [address]   [id number] [phone]    COLLECTION SITE:  Client: [facility]  Location: [facility])           Latest Reference Range & Units 06/19/23 14:54 09/07/23 15:07   ELP Interpretation:  Small monoclonal protein (0.1 g/dL) seen in the gamma fraction. See immunofixation report on same specimen. Pathologic significance requires clinical correlation. Jose Alfredo So M.D., Ph.D., Pathologist. Very small monoclonal protein (about 0.1 g/dL) seen in the mid-gamma fraction. See immunofixation report on same specimen. The beta fraction migrating monoclonal is likely comigrating with C3 complement and not visible by capillary electrophoresis.    Gamma Fraction 0.7 - 1.6 g/dL 0.8 0.8   IGA 84 - 499 mg/dL  299    - 1,616 mg/dL  822   IGM 35 - 242 mg/dL  117   Immunofixation ELP  Small monoclonal IgM  immunoglobulin of  lambda  light chain type. Very " small monoclonal IgM  immunoglobulin of  kappa  light chain type.   Very small monoclonal IgM immunoglobulin of lambda light chain type in the beta fraction and a second very small monoclonal IgM immunoglobulin of kappa light chain type in the mid-gamma fraction.     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 Abdi in presence of his family. I reviewed the finding of his new monoclonal gammopathy. This is considered a pre-malignant condition involving the plasma cells. I reviewed the 3 involved entities including MGUS, smoldering multiple myeloma(MM) and MM. Clearly MM is a serious malignancy involving the plasma cell and has aggressive course. he barely has a very small spike of 0.1 g/dl  -  monoclonal protein seen in the mid-gamma fraction.   This has been identified incidentally as is usually the case. He has mild peripheral neuropathy which could be related to his diabetes mellitus type II or his other comborbidities. He does not have peripheral neuropathy, vasculitis, hemolytic anemia, skin rashes, hypercalcemia which are often seen with MM.   I reviewed some additional tests that could be done as a part of workup including the free light chain assay, beta 2 microglobulin, gamma globulin subsets, urinary protein electrophoresis. While often a bone marrow biopsy is done, it is not mandated given the small spike in abnormal protein.     We could follow him serially with monoclonal spike and work up more aggressively if there was suddenly a rapid increase. The likelihood of having this disease remains small.        PLAN   I would complete some additional work up for his MGUS  Serial follow up of monoclonal spike can be considered,   I would set up a follow up appointment once the above test results are available.   I will personally see him in a year to review results.    Over 60 min of direct face to face time spent with patient with more than 50% time spent in counseling and coordinating care.     Clive Barragan MD    Hematology, Oncology and Transplantation         Again, thank you for allowing me to participate in the care of your patient.        Sincerely,        Clive Barragan MD

## 2023-09-08 LAB
B2 MICROGLOB TUMOR MARKER SER-MCNC: 1.8 MG/L
IGA SERPL-MCNC: 299 MG/DL (ref 84–499)
IGG SERPL-MCNC: 822 MG/DL (ref 610–1616)
IGM SERPL-MCNC: 117 MG/DL (ref 35–242)
KAPPA LC FREE SER-MCNC: 1.88 MG/DL (ref 0.33–1.94)
KAPPA LC FREE/LAMBDA FREE SER NEPH: 1.22 {RATIO} (ref 0.26–1.65)
LAMBDA LC FREE SERPL-MCNC: 1.54 MG/DL (ref 0.57–2.63)
PROT PATTERN SERPL IFE-IMP: NORMAL

## 2023-09-11 LAB
ALBUMIN SERPL ELPH-MCNC: 4.3 G/DL (ref 3.7–5.1)
ALPHA1 GLOB SERPL ELPH-MCNC: 0.3 G/DL (ref 0.2–0.4)
ALPHA2 GLOB SERPL ELPH-MCNC: 0.6 G/DL (ref 0.5–0.9)
B-GLOBULIN SERPL ELPH-MCNC: 0.8 G/DL (ref 0.6–1)
GAMMA GLOB SERPL ELPH-MCNC: 0.8 G/DL (ref 0.7–1.6)
M PROTEIN SERPL ELPH-MCNC: 0.1 G/DL
PROT PATTERN SERPL ELPH-IMP: ABNORMAL

## 2023-09-11 PROCEDURE — 86335 IMMUNFIX E-PHORSIS/URINE/CSF: CPT

## 2023-09-11 PROCEDURE — 81050 URINALYSIS VOLUME MEASURE: CPT

## 2023-09-11 PROCEDURE — 84166 PROTEIN E-PHORESIS/URINE/CSF: CPT

## 2023-09-11 PROCEDURE — 99000 SPECIMEN HANDLING OFFICE-LAB: CPT | Performed by: INTERNAL MEDICINE

## 2023-09-11 PROCEDURE — 83883 ASSAY NEPHELOMETRY NOT SPEC: CPT | Performed by: INTERNAL MEDICINE

## 2023-09-12 LAB
PROT ELPH PNL UR ELPH: NORMAL
PROT PATTERN UR ELPH-IMP: NORMAL

## 2023-09-13 LAB
KAPPA LC UR-MCNC: <0.9 MG/DL
KAPPA LC/LAMBDA UR: NORMAL {RATIO} (ref 0.7–6.2)
LAMBDA LC UR-MCNC: <0.7 MG/DL

## 2023-09-18 ENCOUNTER — ONCOLOGY VISIT (OUTPATIENT)
Dept: ONCOLOGY | Facility: CLINIC | Age: 88
End: 2023-09-18
Payer: MEDICARE

## 2023-09-18 VITALS
DIASTOLIC BLOOD PRESSURE: 76 MMHG | HEIGHT: 70 IN | HEART RATE: 92 BPM | SYSTOLIC BLOOD PRESSURE: 157 MMHG | BODY MASS INDEX: 22.19 KG/M2 | WEIGHT: 155 LBS | OXYGEN SATURATION: 98 %

## 2023-09-18 DIAGNOSIS — D47.2 MGUS (MONOCLONAL GAMMOPATHY OF UNKNOWN SIGNIFICANCE): ICD-10-CM

## 2023-09-18 DIAGNOSIS — R77.8 ABNORMAL SERUM PROTEIN ELECTROPHORESIS: Primary | ICD-10-CM

## 2023-09-18 PROCEDURE — 99214 OFFICE O/P EST MOD 30 MIN: CPT | Performed by: NURSE PRACTITIONER

## 2023-09-18 ASSESSMENT — PAIN SCALES - GENERAL: PAINLEVEL: NO PAIN (0)

## 2023-09-18 NOTE — LETTER
9/18/2023         RE: Washington Abdi  [address]  [facility] [address]        Dear Colleague,    Thank you for referring your patient, Washington Abdi, to the [facility]. Please see a copy of my visit note below.    Oncology Follow Up Visit: September 17, 2023    Hematologist: Dr Clive Barragan  PCP: Jose Lamb    Diagnosis: MGUS  Washington Abdi is a 89 yo male with PMH of a fib and Pacemaker, myasthenia gravis and had polio as a 7 yo which affected his left side. .He also had colon cancer and bowel resection at [place]. His myasthenia was getting worse and had worsening anemia. He was worked up with colonoscopy and ended up needing 16 inches of colon removed for colon cancer.  He follows Dr. Owens in neurology at [place] and he was referred to hematology for abnormal labs with elevated protein. He had a routine annual visit where the blood was checked and was positive for abnormal protein.  He had full work up with Dr Barragan which included free light chain assay, beta 2 microglobulin, gamma globulin subsets, urinary protein electrophoresis. Monoclonal protein level of 0.1 had been noted.     Interval History: Mr Abdi comes to clinic with wife for review of results of additional testing for monoclonal protein.  Patient today again states he is having no new fatigue weight changes night sweats or new muscle aches though continues with some aches to joints as someone would have at 89 years of age.  He also uses a cane to get around and has had no recent falls or changes in health.  Rest of comprehensive and complete ROS is reviewed and is negative.   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    left foot drop     Polymyalgia rheumatica (H)      Prostate cancer (H)     s/p radiation     Syncope     DUE TO BRAYCARDIA     Current Outpatient Medications   Medication     atenolol (TENORMIN) 25 MG tablet     azaTHIOprine (IMURAN) 50 MG tablet     Calcium Carbonate-Vit D-Min (CALTRATE 600+D PLUS PO)     Cholecalciferol (VITAMIN D3 PO)     cyanocolbalamin (VITAMIN B-12) 1000 MCG tablet     diltiazem ER (DILT-XR) 180 MG 24 hr capsule     dorzolamide-timolol (COSOPT) 2-0.5 % ophthalmic solution     Folic Acid 800 MCG TABS     Glucosamine-Chondroit-Vit C-Mn (GLUCOSAMINE CHONDROITIN COMPLX) TABS     latanoprost (XALATAN) 0.005 % ophthalmic solution     Lidocaine (LIDOCARE) 4 % Patch     LOSARTAN POTASSIUM PO     Multiple Vitamin (MULTI-VITAMIN) per tablet     predniSONE (DELTASONE) 5 MG tablet     pyridostigmine (MESTINON) 60 MG tablet     WARFARIN SODIUM PO     No current facility-administered medications for this visit.     Allergies   Allergen Reactions     Aspirin      SUBDURAL HEMORRHAGES     Latex Rash       Physical Exam:BP (!) 157/76 (BP Location: Right arm, Patient Position: Chair, Cuff Size: Adult Regular)   Pulse 92   Ht 1.778 m (5' 10\")   Wt 70.3 kg (155 lb)   SpO2 98%   BMI 22.24 kg/m     ECOG PS- 1  Constitutional: Alert, healthy for age, and in no distress.   ENT: Eyes bright and reactive  Neck: Supple, No adenopathy.  Cardiac: Heart rate and rhythm is regular and strong without murmur  Respiratory: Breathing easy. Lung sounds clear to auscultation  GI: Abdomen is soft, non-tender, BS normal.   MS: Muscle tone fair to good for age - uses cane, extremities normal with no edema.   Skin: No suspicious lesions or rashes  Neuro: Sensory grossly WNL, gait normal.   Psych: Mentation appears normal and affect normal/bright and smiling    Laboratory/Imaging Results:    Latest Reference Range & Units 09/07/23 15:07   Sodium 136 - " 145 mmol/L 139   Potassium 3.4 - 5.3 mmol/L 4.3   Chloride 98 - 107 mmol/L 101   Carbon Dioxide (CO2) 22 - 29 mmol/L 27   Urea Nitrogen 8.0 - 23.0 mg/dL 18.0   Creatinine 0.67 - 1.17 mg/dL 0.89   GFR Estimate >60 mL/min/1.73m2 81   Calcium 8.2 - 9.6 mg/dL 9.7 (H)   Anion Gap 7 - 15 mmol/L 11   Albumin 3.5 - 5.2 g/dL 4.6   Protein Total 6.4 - 8.3 g/dL 7.4   Alkaline Phosphatase 40 - 129 U/L 73   ALT 0 - 70 U/L 11   AST 0 - 45 U/L 20   Beta-2-Microglobulin <2.3 mg/L 1.8   Bilirubin Total <=1.2 mg/dL 0.4   Glucose 70 - 99 mg/dL 168 (H)   Lactate Dehydrogenase 0 - 250 U/L 223   WBC 4.0 - 11.0 10e3/uL 9.3   Hemoglobin 13.3 - 17.7 g/dL 12.2 (L)   Hematocrit 40.0 - 53.0 % 36.3 (L)   Platelet Count 150 - 450 10e3/uL 202   RBC Count 4.40 - 5.90 10e6/uL 3.74 (L)   MCV 78 - 100 fL 97   MCH 26.5 - 33.0 pg 32.6   MCHC 31.5 - 36.5 g/dL 33.6   RDW 10.0 - 15.0 % 13.5   % Neutrophils % 88   % Lymphocytes % 7   % Monocytes % 5   % Eosinophils % 0   % Basophils % 0   Absolute Basophils 0.0 - 0.2 10e3/uL 0.0   Absolute Eosinophils 0.0 - 0.7 10e3/uL 0.0   Absolute Immature Granulocytes <=0.4 10e3/uL 0.0   Absolute Lymphocytes 0.8 - 5.3 10e3/uL 0.7 (L)   Absolute Monocytes 0.0 - 1.3 10e3/uL 0.5   % Immature Granulocytes % 0   Absolute Neutrophils 1.6 - 8.3 10e3/uL 8.1   Absolute NRBCs 10e3/uL 0.0   NRBCs per 100 WBC <1 /100 0   Albumin Fraction 3.7 - 5.1 g/dL 4.3   Alpha 1 Fraction 0.2 - 0.4 g/dL 0.3   Alpha 2 Fraction 0.5 - 0.9 g/dL 0.6   Beta Fraction 0.6 - 1.0 g/dL 0.8   ELP Interpretation:  Very small monoclonal protein (about 0.1 g/dL) seen in the mid-gamma fraction. See immunofixation report on same specimen. The beta fraction migrating monoclonal is likely comigrating with C3 complement and not visible by capillary electrophoresis. Pathologic significance requires clinical correlation. SANTOSH Harley M.D., Ph.D., Pathologist ().   Gamma Fraction 0.7 - 1.6 g/dL 0.8   IGA 84 - 499 mg/dL 299    - 1,616 mg/dL 822    IGM 35 - 242 mg/dL 117   Immunofixation ELP  Very small monoclonal IgM immunoglobulin of lambda light chain type in the beta fraction and a second very small monoclonal IgM immunoglobulin of kappa light chain type in the mid-gamma fraction. Pathologic significance requires clinical correlation.  SANTOSH Harley M.D., Ph.D., Pathologist ()   Kappa Free Lt Chain 0.33 - 1.94 mg/dL 1.88   Kappa Lambda Ratio 0.26 - 1.65  1.22   Lambda Free Lt Chain 0.57 - 2.63 mg/dL 1.54   Monoclonal Peak <=0.0 g/dL 0.1 (H)   Total Protein Serum for ELP 6.4 - 8.3 g/dL 6.8   (H): Data is abnormally high  (L): Data is abnormally low  Assessment and Plan:   MGUS-patient of the finding of an abnormal protein upon review for his myasthenia gravis.  Repeat labs from initial finding in June finds no significant changes that would lead us to concern for progression of the MGUS.  We did talk about the low possibility of progression of the MGUS but the fact that we need to continue to follow due to risk of disease.  We will have him back in 6 months for review and after that if everything is stable we will check year to year.  He will have labs done 1 week prior to visit . he is to share if He is having new weight loss that is unexpected night sweats or significant fatigue that is new.  Patient is 89 years of age and feels he is doing well for his age which I agree.  Labs are ordered understanding orders.  The total time of this encounter amounted to  30 minutes. This time face-to-face included time spent with the patient, prep work, ordering tests, and performing post visit documentation.  Joaquina Allen Cnp      Again, thank you for allowing me to participate in the care of your patient.        Sincerely,        Joaquina Allen, LESLIE, APRN CNP

## 2023-09-18 NOTE — NURSING NOTE
"Oncology Rooming Note    September 18, 2023 8:57 AM   Washington Abdi is a 89 year old male who presents for:    Chief Complaint   Patient presents with    Oncology Clinic Visit     Follow up     Initial Vitals: BP (!) 157/76 (BP Location: Right arm, Patient Position: Chair, Cuff Size: Adult Regular)   Pulse 92   Ht 1.778 m (5' 10\")   Wt 70.3 kg (155 lb)   SpO2 98%   BMI 22.24 kg/m   Estimated body mass index is 22.24 kg/m  as calculated from the following:    Height as of this encounter: 1.778 m (5' 10\").    Weight as of this encounter: 70.3 kg (155 lb). Body surface area is 1.86 meters squared.  No Pain (0) Comment: Data Unavailable   No LMP for male patient.  Allergies reviewed: Yes  Medications reviewed: Yes    Medications: Medication refills not needed today.  Pharmacy name entered into EPIC:    [facility] - [address] [facility] - [facility] [address] [address] [place] [address] DRUG STORE #[id number] - [place] [id number] [facility] [address]    Clinical concerns: NO  Joaquina was notified.      Brittany Justice CMA              "

## 2023-09-18 NOTE — PROGRESS NOTES
Oncology Follow Up Visit: September 17, 2023    Hematologist: Dr Clive Barragan  PCP: Jose Lamb    Diagnosis: MGUS  Washington Abdi is a 89 yo male with PMH of a fib and Pacemaker, myasthenia gravis and had polio as a 5 yo which affected his left side. .He also had colon cancer and bowel resection at [place]. His myasthenia was getting worse and had worsening anemia. He was worked up with colonoscopy and ended up needing 16 inches of colon removed for colon cancer.  He follows Dr. Owens in neurology at [place] and he was referred to hematology for abnormal labs with elevated protein. He had a routine annual visit where the blood was checked and was positive for abnormal protein.  He had full work up with Dr Barragan which included free light chain assay, beta 2 microglobulin, gamma globulin subsets, urinary protein electrophoresis. Monoclonal protein level of 0.1 had been noted.     Interval History: Mr Abdi comes to clinic with wife for review of results of additional testing for monoclonal protein.  Patient today again states he is having no new fatigue weight changes night sweats or new muscle aches though continues with some aches to joints as someone would have at 89 years of age.  He also uses a cane to get around and has had no recent falls or changes in health.  Rest of comprehensive and complete ROS is reviewed and is negative.   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    left foot drop    Polymyalgia rheumatica (H)     Prostate cancer (H)     s/p radiation    Syncope     DUE TO BRAYCARDIA     Current Outpatient Medications   Medication    atenolol (TENORMIN) 25  "MG tablet    azaTHIOprine (IMURAN) 50 MG tablet    Calcium Carbonate-Vit D-Min (CALTRATE 600+D PLUS PO)    Cholecalciferol (VITAMIN D3 PO)    cyanocolbalamin (VITAMIN B-12) 1000 MCG tablet    diltiazem ER (DILT-XR) 180 MG 24 hr capsule    dorzolamide-timolol (COSOPT) 2-0.5 % ophthalmic solution    Folic Acid 800 MCG TABS    Glucosamine-Chondroit-Vit C-Mn (GLUCOSAMINE CHONDROITIN COMPLX) TABS    latanoprost (XALATAN) 0.005 % ophthalmic solution    Lidocaine (LIDOCARE) 4 % Patch    LOSARTAN POTASSIUM PO    Multiple Vitamin (MULTI-VITAMIN) per tablet    predniSONE (DELTASONE) 5 MG tablet    pyridostigmine (MESTINON) 60 MG tablet    WARFARIN SODIUM PO     No current facility-administered medications for this visit.     Allergies   Allergen Reactions    Aspirin      SUBDURAL HEMORRHAGES    Latex Rash       Physical Exam:BP (!) 157/76 (BP Location: Right arm, Patient Position: Chair, Cuff Size: Adult Regular)   Pulse 92   Ht 1.778 m (5' 10\")   Wt 70.3 kg (155 lb)   SpO2 98%   BMI 22.24 kg/m     ECOG PS- 1  Constitutional: Alert, healthy for age, and in no distress.   ENT: Eyes bright and reactive  Neck: Supple, No adenopathy.  Cardiac: Heart rate and rhythm is regular and strong without murmur  Respiratory: Breathing easy. Lung sounds clear to auscultation  GI: Abdomen is soft, non-tender, BS normal.   MS: Muscle tone fair to good for age - uses cane, extremities normal with no edema.   Skin: No suspicious lesions or rashes  Neuro: Sensory grossly WNL, gait normal.   Psych: Mentation appears normal and affect normal/bright and smiling    Laboratory/Imaging Results:    Latest Reference Range & Units 09/07/23 15:07   Sodium 136 - 145 mmol/L 139   Potassium 3.4 - 5.3 mmol/L 4.3   Chloride 98 - 107 mmol/L 101   Carbon Dioxide (CO2) 22 - 29 mmol/L 27   Urea Nitrogen 8.0 - 23.0 mg/dL 18.0   Creatinine 0.67 - 1.17 mg/dL 0.89   GFR Estimate >60 mL/min/1.73m2 81   Calcium 8.2 - 9.6 mg/dL 9.7 (H)   Anion Gap 7 - 15 mmol/L 11 "   Albumin 3.5 - 5.2 g/dL 4.6   Protein Total 6.4 - 8.3 g/dL 7.4   Alkaline Phosphatase 40 - 129 U/L 73   ALT 0 - 70 U/L 11   AST 0 - 45 U/L 20   Beta-2-Microglobulin <2.3 mg/L 1.8   Bilirubin Total <=1.2 mg/dL 0.4   Glucose 70 - 99 mg/dL 168 (H)   Lactate Dehydrogenase 0 - 250 U/L 223   WBC 4.0 - 11.0 10e3/uL 9.3   Hemoglobin 13.3 - 17.7 g/dL 12.2 (L)   Hematocrit 40.0 - 53.0 % 36.3 (L)   Platelet Count 150 - 450 10e3/uL 202   RBC Count 4.40 - 5.90 10e6/uL 3.74 (L)   MCV 78 - 100 fL 97   MCH 26.5 - 33.0 pg 32.6   MCHC 31.5 - 36.5 g/dL 33.6   RDW 10.0 - 15.0 % 13.5   % Neutrophils % 88   % Lymphocytes % 7   % Monocytes % 5   % Eosinophils % 0   % Basophils % 0   Absolute Basophils 0.0 - 0.2 10e3/uL 0.0   Absolute Eosinophils 0.0 - 0.7 10e3/uL 0.0   Absolute Immature Granulocytes <=0.4 10e3/uL 0.0   Absolute Lymphocytes 0.8 - 5.3 10e3/uL 0.7 (L)   Absolute Monocytes 0.0 - 1.3 10e3/uL 0.5   % Immature Granulocytes % 0   Absolute Neutrophils 1.6 - 8.3 10e3/uL 8.1   Absolute NRBCs 10e3/uL 0.0   NRBCs per 100 WBC <1 /100 0   Albumin Fraction 3.7 - 5.1 g/dL 4.3   Alpha 1 Fraction 0.2 - 0.4 g/dL 0.3   Alpha 2 Fraction 0.5 - 0.9 g/dL 0.6   Beta Fraction 0.6 - 1.0 g/dL 0.8   ELP Interpretation:  Very small monoclonal protein (about 0.1 g/dL) seen in the mid-gamma fraction. See immunofixation report on same specimen. The beta fraction migrating monoclonal is likely comigrating with C3 complement and not visible by capillary electrophoresis. Pathologic significance requires clinical correlation. SANTOSH Harley M.D., Ph.D., Pathologist ().   Gamma Fraction 0.7 - 1.6 g/dL 0.8   IGA 84 - 499 mg/dL 299    - 1,616 mg/dL 822   IGM 35 - 242 mg/dL 117   Immunofixation ELP  Very small monoclonal IgM immunoglobulin of lambda light chain type in the beta fraction and a second very small monoclonal IgM immunoglobulin of kappa light chain type in the mid-gamma fraction. Pathologic significance requires clinical  correlation.  SANTOSH Harley M.D., Ph.D., Pathologist ()   Kappa Free Lt Chain 0.33 - 1.94 mg/dL 1.88   Kappa Lambda Ratio 0.26 - 1.65  1.22   Lambda Free Lt Chain 0.57 - 2.63 mg/dL 1.54   Monoclonal Peak <=0.0 g/dL 0.1 (H)   Total Protein Serum for ELP 6.4 - 8.3 g/dL 6.8   (H): Data is abnormally high  (L): Data is abnormally low  Assessment and Plan:   MGUS-patient of the finding of an abnormal protein upon review for his myasthenia gravis.  Repeat labs from initial finding in June finds no significant changes that would lead us to concern for progression of the MGUS.  We did talk about the low possibility of progression of the MGUS but the fact that we need to continue to follow due to risk of disease.  We will have him back in 6 months for review and after that if everything is stable we will check year to year.  He will have labs done 1 week prior to visit . he is to share if He is having new weight loss that is unexpected night sweats or significant fatigue that is new.  Patient is 89 years of age and feels he is doing well for his age which I agree.  Labs are ordered understanding orders.  The total time of this encounter amounted to  30 minutes. This time face-to-face included time spent with the patient, prep work, ordering tests, and performing post visit documentation.  Joaquina Allen,Cnp

## 2023-10-02 ENCOUNTER — APPOINTMENT (OUTPATIENT)
Dept: LAB | Facility: CLINIC | Age: 88
End: 2023-10-02
Payer: MEDICARE

## 2023-10-16 DIAGNOSIS — G70.00 MYASTHENIA GRAVIS WITHOUT EXACERBATION (H): ICD-10-CM

## 2023-10-17 RX ORDER — AZATHIOPRINE 50 MG/1
50 TABLET ORAL DAILY
Qty: 90 TABLET | Refills: 1 | Status: SHIPPED | OUTPATIENT
Start: 2023-10-17 | End: 2024-01-17

## 2023-11-17 DIAGNOSIS — G70.00 MYASTHENIA GRAVIS WITHOUT EXACERBATION (H): ICD-10-CM

## 2023-11-19 RX ORDER — PREDNISONE 5 MG/1
5 TABLET ORAL DAILY
Qty: 90 TABLET | Refills: 1 | Status: SHIPPED | OUTPATIENT
Start: 2023-11-19 | End: 2024-01-17

## 2024-01-17 DIAGNOSIS — G70.00 MYASTHENIA GRAVIS WITHOUT EXACERBATION (H): ICD-10-CM

## 2024-01-17 RX ORDER — PREDNISONE 5 MG/1
5 TABLET ORAL DAILY
Qty: 90 TABLET | Refills: 1 | Status: SHIPPED | OUTPATIENT
Start: 2024-01-17 | End: 2024-02-20

## 2024-01-17 RX ORDER — PYRIDOSTIGMINE BROMIDE 60 MG/1
TABLET ORAL
Qty: 405 TABLET | Refills: 1 | Status: SHIPPED | OUTPATIENT
Start: 2024-01-17

## 2024-01-17 RX ORDER — AZATHIOPRINE 50 MG/1
50 TABLET ORAL DAILY
Qty: 90 TABLET | Refills: 1 | Status: SHIPPED | OUTPATIENT
Start: 2024-01-17 | End: 2024-04-29

## 2024-02-01 ENCOUNTER — TELEPHONE (OUTPATIENT)
Dept: NEUROLOGY | Facility: CLINIC | Age: 89
End: 2024-02-01
Payer: MEDICARE

## 2024-02-01 NOTE — TELEPHONE ENCOUNTER
Due to a change in Dr. Owens's schedule he will not be available on 6/17. Need to reschedule patient's appointment.    Patient Contacted to schedule the following:    Appointment type: Return Myasthenia Gravis (in person)  Provider: Graciela  Return date: Close to 6/17  Specialty phone number: [phone]    Spoke with patient, rescheduled to 6/24 at 1:30pm.    Tima Early on 2/1/2024 at 10:07 AM

## 2024-02-20 DIAGNOSIS — G70.00 MYASTHENIA GRAVIS WITHOUT EXACERBATION (H): ICD-10-CM

## 2024-02-20 RX ORDER — PREDNISONE 5 MG/1
5 TABLET ORAL DAILY
Qty: 90 TABLET | Refills: 1 | Status: SHIPPED | OUTPATIENT
Start: 2024-02-20 | End: 2024-04-29

## 2024-02-20 NOTE — TELEPHONE ENCOUNTER
[facility] Call Center    Phone Message    May a detailed message be left on voicemail: yes     Reason for Call: Medication Refill Request    Has the patient contacted the pharmacy for the refill? Yes   Name of medication being requested:   predniSONE (DELTASONE) 5 MG tablet    Provider who prescribed the medication:   Dom Owens    Date medication is needed: ASAP  Pt recently changed pharmacy    Pharmacy:   [facility]  [address] [facility]  [phone]       Action Taken: Other: Neurology    Travel Screening: Not Applicable

## 2024-04-15 ENCOUNTER — TRANSFERRED RECORDS (OUTPATIENT)
Dept: HEALTH INFORMATION MANAGEMENT | Facility: CLINIC | Age: 89
End: 2024-04-15
Payer: MEDICARE

## 2024-04-29 ENCOUNTER — OFFICE VISIT (OUTPATIENT)
Dept: NEUROLOGY | Facility: CLINIC | Age: 89
End: 2024-04-29
Payer: MEDICARE

## 2024-04-29 ENCOUNTER — LAB (OUTPATIENT)
Dept: LAB | Facility: CLINIC | Age: 89
End: 2024-04-29
Payer: MEDICARE

## 2024-04-29 VITALS
WEIGHT: 150.5 LBS | HEART RATE: 94 BPM | HEIGHT: 70 IN | BODY MASS INDEX: 21.55 KG/M2 | OXYGEN SATURATION: 97 % | SYSTOLIC BLOOD PRESSURE: 157 MMHG | DIASTOLIC BLOOD PRESSURE: 77 MMHG

## 2024-04-29 DIAGNOSIS — G70.00 MYASTHENIA GRAVIS WITHOUT EXACERBATION (H): Primary | ICD-10-CM

## 2024-04-29 DIAGNOSIS — G70.00 MYASTHENIA GRAVIS WITHOUT EXACERBATION (H): ICD-10-CM

## 2024-04-29 LAB
ALBUMIN SERPL BCG-MCNC: 4.1 G/DL (ref 3.5–5.2)
ALP SERPL-CCNC: 71 U/L (ref 40–150)
ALT SERPL W P-5'-P-CCNC: 10 U/L (ref 0–70)
ANION GAP SERPL CALCULATED.3IONS-SCNC: 11 MMOL/L (ref 7–15)
AST SERPL W P-5'-P-CCNC: 15 U/L (ref 0–45)
BASOPHILS # BLD AUTO: 0 10E3/UL (ref 0–0.2)
BASOPHILS NFR BLD AUTO: 0 %
BILIRUB SERPL-MCNC: 0.3 MG/DL
BUN SERPL-MCNC: 25.8 MG/DL (ref 8–23)
CALCIUM SERPL-MCNC: 9.3 MG/DL (ref 8.2–9.6)
CHLORIDE SERPL-SCNC: 104 MMOL/L (ref 98–107)
CREAT SERPL-MCNC: 0.93 MG/DL (ref 0.67–1.17)
DEPRECATED HCO3 PLAS-SCNC: 25 MMOL/L (ref 22–29)
EGFRCR SERPLBLD CKD-EPI 2021: 78 ML/MIN/1.73M2
EOSINOPHIL # BLD AUTO: 0 10E3/UL (ref 0–0.7)
EOSINOPHIL NFR BLD AUTO: 0 %
ERYTHROCYTE [DISTWIDTH] IN BLOOD BY AUTOMATED COUNT: 13.4 % (ref 10–15)
GLUCOSE SERPL-MCNC: 141 MG/DL (ref 70–99)
HCT VFR BLD AUTO: 35.3 % (ref 40–53)
HGB BLD-MCNC: 12.2 G/DL (ref 13.3–17.7)
IMM GRANULOCYTES # BLD: 0 10E3/UL
IMM GRANULOCYTES NFR BLD: 1 %
LYMPHOCYTES # BLD AUTO: 0.8 10E3/UL (ref 0.8–5.3)
LYMPHOCYTES NFR BLD AUTO: 9 %
MCH RBC QN AUTO: 33.1 PG (ref 26.5–33)
MCHC RBC AUTO-ENTMCNC: 34.6 G/DL (ref 31.5–36.5)
MCV RBC AUTO: 96 FL (ref 78–100)
MONOCYTES # BLD AUTO: 0.5 10E3/UL (ref 0–1.3)
MONOCYTES NFR BLD AUTO: 6 %
NEUTROPHILS # BLD AUTO: 7.2 10E3/UL (ref 1.6–8.3)
NEUTROPHILS NFR BLD AUTO: 84 %
NRBC # BLD AUTO: 0 10E3/UL
NRBC BLD AUTO-RTO: 0 /100
PLATELET # BLD AUTO: 176 10E3/UL (ref 150–450)
POTASSIUM SERPL-SCNC: 4.3 MMOL/L (ref 3.4–5.3)
PROT SERPL-MCNC: 6.6 G/DL (ref 6.4–8.3)
RBC # BLD AUTO: 3.69 10E6/UL (ref 4.4–5.9)
SODIUM SERPL-SCNC: 140 MMOL/L (ref 135–145)
WBC # BLD AUTO: 8.5 10E3/UL (ref 4–11)

## 2024-04-29 PROCEDURE — 86041 ACETYLCHOLN RCPTR BNDNG ANTB: CPT | Performed by: PSYCHIATRY & NEUROLOGY

## 2024-04-29 PROCEDURE — 99215 OFFICE O/P EST HI 40 MIN: CPT | Performed by: PSYCHIATRY & NEUROLOGY

## 2024-04-29 PROCEDURE — 86043 ACETYLCHOLN RCPTR MODLG ANTB: CPT | Performed by: PSYCHIATRY & NEUROLOGY

## 2024-04-29 PROCEDURE — 80053 COMPREHEN METABOLIC PANEL: CPT | Performed by: PATHOLOGY

## 2024-04-29 PROCEDURE — 36415 COLL VENOUS BLD VENIPUNCTURE: CPT | Performed by: PATHOLOGY

## 2024-04-29 PROCEDURE — 85025 COMPLETE CBC W/AUTO DIFF WBC: CPT | Performed by: PATHOLOGY

## 2024-04-29 RX ORDER — PREDNISONE 5 MG/1
5 TABLET ORAL DAILY
Qty: 90 TABLET | Refills: 1 | Status: SHIPPED | OUTPATIENT
Start: 2024-04-29

## 2024-04-29 RX ORDER — AZATHIOPRINE 50 MG/1
150 TABLET ORAL DAILY
Qty: 90 TABLET | Refills: 1 | Status: SHIPPED | OUTPATIENT
Start: 2024-04-29 | End: 2024-06-27

## 2024-04-29 ASSESSMENT — PAIN SCALES - GENERAL: PAINLEVEL: NO PAIN (0)

## 2024-04-29 NOTE — LETTER
4/29/2024       RE: Washington Abdi  [address]  [address]  [place] [address]       Dear Colleague,    Thank you for referring your patient, Washington Abdi, to the [facility] [place] at [facility]. Please see a copy of my visit note below.                 [facility]                  Muscular Dystrophy Clinic Note     Washington Abdi MRN# [id number]   YOB: 1933 Age: 89 year old      Date of Visit: Apr 29, 2024    Primary care provider: Jose Lamb    Refering physician:         Chief Complaint:     Follow-up       History is obtained from the patient, family and medical record       Interval Change:      Washington Abdi is a 89 year old male was seen and examined at the muscular dystrophy clinic on Apr 29, 2024 for evaluation of myasthenia gravis.  He reports some progression of his symptoms.  Developed progressive ptosis mainly involving his right eye.  This has happened gradually.  There is minimal fluctuation.  The eyes better open in the morning and worse in the evening.  He is not able to tolerate high dose of pyridostigmine.  This is due to gastrointestinal complications.  He reports no problems with his swollen speech or respiratory function.  She is able to use his upper extremities as previously.  He does have significant weakness in the lower extremities.  Then she is not able to lift up from a chair without use of his upper extremities.  He continues to take 5 mg Prednisone daily. He is prediabetic.  He is not able to use high-dose of prednisone because of risk of worsening of his prediabetes and development of diabetes.    MG Activities of Daily Living (MG-ADL) profile     Grade 0 1 2 3   Talking Normal Intermittent slurring/nasal speech Constant slurring/nasal speech, can be understood Difficult to understand   Chewing Normal Fatigue with solid food Fatigue with soft food G tube   Swallowing Normal  Rare choking Frequent choking necessitating diet changes G tube   Breathing Normal SOB with exertion SOB at rest Ventilator dependent   Ability to brush teeth/comb hair Normal Extra effort, no rest periods needed Rest periods needed Cannot do one of these   Ability to rise from chair Normal Mild impairment, uses arms sometimes Moderate impairment, always uses arms Severe impairment, requires assistance   Double vision None Present, not daily Daily, not constant Constant   Ptosis  Present, but not daily Daily, not constant Constant      He walks with a walker.                Allergies:      Allergies   Allergen Reactions    Aspirin      SUBDURAL HEMORRHAGES    Latex Rash             Medications:     Prescription Medications as of 2024         Rx Number Disp Refills Start End Last Dispensed Date Next Fill Date Owning Pharmacy    atenolol (TENORMIN) 25 MG tablet  -- --  --       Sig: Take 1 tablet by mouth daily.    Class: Historical    Route: Oral    azaTHIOprine (IMURAN) 50 MG tablet  90 tablet 1 2024 --   [facility] #[id number] - [address] [facility] [address]    Sig: Take 1 tablet (50 mg) by mouth daily    Class: E-Prescribe    Route: Oral    Calcium Carbonate-Vit D-Min (CALTRATE 600+D PLUS PO)  -- --  --       Sig: Take 1 tablet by mouth 2 times daily    Class: Historical    Route: Oral    Cholecalciferol (VITAMIN D3 PO)  -- --  --       Sig: Take 2,000 Units by mouth daily    Class: Historical    Route: Oral    cyanocolbalamin (VITAMIN B-12) 1000 MCG tablet  -- --  --       Sig: Take 1 tablet by mouth daily. As directed    Class: Historical    Route: Oral    diltiazem ER (DILT-XR) 180 MG 24 hr capsule  -- --  --   [facility] #[id number] - [facility] [address] AT [facility] [address] [facility] [address]    Sig: Take 180 mg by mouth daily    Class: Historical    Route: Oral    dorzolamide-timolol (COSOPT) 2-0.5 % ophthalmic solution  -- -- 5/3/2023 --       Si  "drop    Class: Historical    Folic Acid 800 MCG TABS  -- --  --       Sig: Take 2 tablets by mouth daily.    Class: Historical    Route: Oral    Glucosamine-Chondroit-Vit C-Mn (GLUCOSAMINE CHONDROITIN COMPLX) TABS  -- --  --       Sig: Take 1 tablet by mouth daily.    Class: Historical    Route: Oral    latanoprost (XALATAN) 0.005 % ophthalmic solution  -- --  --       Sig: Place 1 drop into both eyes At Bedtime    Class: Historical    Route: Both Eyes    Lidocaine (LIDOCARE) 4 % Patch  -- -- 5/3/2023 --       Sig: Place 1 patch onto the skin    Class: Historical    Route: Transdermal    LOSARTAN POTASSIUM PO  -- --  --       Sig: Take 25 mg by mouth daily    Class: Historical    Route: Oral    Multiple Vitamin (MULTI-VITAMIN) per tablet  -- --  --       Sig: Take 1 tablet by mouth daily.    Class: Historical    Route: Oral    predniSONE (DELTASONE) 5 MG tablet  90 tablet 1 2/20/2024 --   [facility] - [address]    Sig: Take 1 tablet (5 mg) by mouth daily    Class: E-Prescribe    Route: Oral    pyRIDostigmine (MESTINON) 60 MG tablet  405 tablet 1 1/17/2024 --   [facility] #[id number] - [place] [address] AT [facility] OF  [address]    Sig: Take 1/2 tablet 3x a day or a total of 90 mg daily    Class: E-Prescribe    WARFARIN SODIUM PO  -- --  --       Sig: Take by mouth daily    Class: Historical    Route: Oral                   Physical Exam:     BP (!) 157/77 (BP Location: Right arm, Patient Position: Sitting, Cuff Size: Adult Regular)   Pulse 94   Ht 1.778 m (5' 10\")   Wt 68.3 kg (150 lb 8 oz)   SpO2 97%   BMI 21.59 kg/m     Physical Exam:   General: NAD  Extremities: Warm, dry  Neurologic:   Mental Status Exam: Alert, awake and easily engaged in interaction.   Cranial Nerves: PERRLA, EOMs present but gaze is disconjugate it.  There is diplopia and lateral gaze.  Right-sided ptosis with full covering of his eye., no nystagmus, facial movements symmetric,    " "             facial sensation intact to light touch, hearing intact to conversation, palate and uvula               rise symmetrically, no deviation in uvula or tongue, tongue midline and fully mobile                with no atrophy or fasciculations.    Motor: Normal tone in all four extremities, no atrophy or fasciculations.             Manual Motor Exam     Right Left A F  Right Left A F   Shoulder Abduction 4.5 4.5   Hip Flexion 4 3.5     Elbow Flexion 5 5   Knee Extension 5 5     Elbow Extension 5 5   Knee Flexion 5 5     Wrist Extension 5 5   Foot Dorsiflexion 5 5     Wrist flexion 5 5   Foot Plantar Flexion 5 5     Hip abduction 4 4   Hip adduction 4.5 4.5     Neck flexion 4 4             Gait: walks with a walker.            Data:   CBC:  Lab Results   Component Value Date    WBC 9.3 09/07/2023    WBC 9.3 10/26/2015     Lab Results   Component Value Date    RBC 3.74 09/07/2023    RBC 3.83 10/26/2015     Lab Results   Component Value Date    HGB 12.2 09/07/2023    HGB 12.1 10/26/2015     Lab Results   Component Value Date    HCT 36.3 09/07/2023    HCT 36.8 10/26/2015     No components found for: \"MCT\"  Lab Results   Component Value Date    MCV 97 09/07/2023    MCV 96 10/26/2015     Lab Results   Component Value Date    MCH 32.6 09/07/2023    MCH 31.6 10/26/2015     Lab Results   Component Value Date    MCHC 33.6 09/07/2023    MCHC 32.9 10/26/2015     Lab Results   Component Value Date    RDW 13.5 09/07/2023    RDW 13.5 10/26/2015     Lab Results   Component Value Date     09/07/2023     10/26/2015       Last Basic Metabolic Panel:  Lab Results   Component Value Date     09/07/2023     02/02/2010      Lab Results   Component Value Date    POTASSIUM 4.3 09/07/2023    POTASSIUM 4.0 02/02/2010     Lab Results   Component Value Date    CHLORIDE 101 09/07/2023    CHLORIDE 99 02/02/2010     Lab Results   Component Value Date    TYE 9.7 09/07/2023    TYE 10.1 02/02/2010     Lab Results "   Component Value Date    CO2 27 09/07/2023    CO2 33 02/02/2010     Lab Results   Component Value Date    BUN 18.0 09/07/2023    BUN 25 02/02/2010     Lab Results   Component Value Date    CR 0.89 09/07/2023    CR 0.79 02/02/2010     Lab Results   Component Value Date     09/07/2023     02/02/2010            Assessment and Recommendations:     Washington Abdi is a 89 year old male presents with an acquired autoimmune generalized myasthenia gravis previously restricted to only mild ocular symptoms and was in remission for some time.  Total course of last year or so he started to have progression of his symptoms and currently presents with exacerbation with significant debilitating right-sided ptosis of the eyelid and significant weakness in the lower extremities.  At this point his weakness and lower extremity should be considered as exacerbation of myasthenia gravis.  Treatment options are limited due to potential high risk for complications such as use of prednisone along his high risk for potential side effects and development of diabetes.  Use of intravenous immunoglobulin is also not a benign treatment given his age as he is at high risk for thrombotic complications as well as high risk of developing renal insufficiency.  I have discussed with him alternative treatment with Vyvgart which may be the best option for him to get quick improvement in his symptoms.    Recommendations:  -Increase azathioprine to 150 mg daily.  -Continue Prednisone 5 mg  -Continue Pyrisdostigmine as tolerated  -Start Vyvgart  -Return to clinic in 3 months.    I have spent at least  40 min on the date of the encounter in face-to-face evaluation, chart review, patient visit, review of tests, counseling the patient, documentation about the issues documented above. See note for details.    Sincerely,        Dom Owens MD  Neurology and Neuromuscular medicine  [phone]               Again, thank you for allowing me to  participate in the care of your patient.      Sincerely,    Dom Owens MD

## 2024-04-29 NOTE — NURSING NOTE
Chief Complaint   Patient presents with    RECHECK    Myasthenia Gravis       Vitals were taken and medications were reconciled.    Cecy Robert, Technician  3:41 PM  April 29, 2024

## 2024-04-29 NOTE — PROGRESS NOTES
[facility]                  Muscular Dystrophy Clinic Note     Washington Abdi MRN# [id number]   YOB: 1933 Age: 89 year old      Date of Visit: Apr 29, 2024    Primary care provider: Jose Lamb    Refering physician:         Chief Complaint:     Follow-up       History is obtained from the patient, family and medical record       Interval Change:      Washington Abdi is a 89 year old male was seen and examined at the muscular dystrophy clinic on Apr 29, 2024 for evaluation of myasthenia gravis.  He reports some progression of his symptoms.  Developed progressive ptosis mainly involving his right eye.  This has happened gradually.  There is minimal fluctuation.  The eyes better open in the morning and worse in the evening.  He is not able to tolerate high dose of pyridostigmine.  This is due to gastrointestinal complications.  He reports no problems with his swollen speech or respiratory function.  She is able to use his upper extremities as previously.  He does have significant weakness in the lower extremities.  Then she is not able to lift up from a chair without use of his upper extremities.  He continues to take 5 mg Prednisone daily. He is prediabetic.  He is not able to use high-dose of prednisone because of risk of worsening of his prediabetes and development of diabetes.    MG Activities of Daily Living (MG-ADL) profile     Grade 0 1 2 3   Talking Normal Intermittent slurring/nasal speech Constant slurring/nasal speech, can be understood Difficult to understand   Chewing Normal Fatigue with solid food Fatigue with soft food G tube   Swallowing Normal Rare choking Frequent choking necessitating diet changes G tube   Breathing Normal SOB with exertion SOB at rest Ventilator dependent   Ability to brush teeth/comb hair Normal Extra effort, no rest periods needed Rest periods needed Cannot do one of these   Ability to rise from chair Normal Mild impairment, uses  arms sometimes Moderate impairment, always uses arms Severe impairment, requires assistance   Double vision None Present, not daily Daily, not constant Constant   Ptosis  Present, but not daily Daily, not constant Constant      He walks with a walker.                Allergies:      Allergies   Allergen Reactions    Aspirin      SUBDURAL HEMORRHAGES    Latex Rash             Medications:     Prescription Medications as of 2024         Rx Number Disp Refills Start End Last Dispensed Date Next Fill Date Owning Pharmacy    atenolol (TENORMIN) 25 MG tablet  -- --  --       Sig: Take 1 tablet by mouth daily.    Class: Historical    Route: Oral    azaTHIOprine (IMURAN) 50 MG tablet  90 tablet 1 2024 --   [facility] #[id number] - [address] AT [facility] OF  [address]    Sig: Take 1 tablet (50 mg) by mouth daily    Class: E-Prescribe    Route: Oral    Calcium Carbonate-Vit D-Min (CALTRATE 600+D PLUS PO)  -- --  --       Sig: Take 1 tablet by mouth 2 times daily    Class: Historical    Route: Oral    Cholecalciferol (VITAMIN D3 PO)  -- --  --       Sig: Take 2,000 Units by mouth daily    Class: Historical    Route: Oral    cyanocolbalamin (VITAMIN B-12) 1000 MCG tablet  -- --  --       Sig: Take 1 tablet by mouth daily. As directed    Class: Historical    Route: Oral    diltiazem ER (DILT-XR) 180 MG 24 hr capsule  -- --  --   [facility] #[id number] - [facility] [address] AT [facility] OF [place] & [facility] [address]    Sig: Take 180 mg by mouth daily    Class: Historical    Route: Oral    dorzolamide-timolol (COSOPT) 2-0.5 % ophthalmic solution  -- -- 5/3/2023 --       Si drop    Class: Historical    Folic Acid 800 MCG TABS  -- --  --       Sig: Take 2 tablets by mouth daily.    Class: Historical    Route: Oral    Glucosamine-Chondroit-Vit C-Mn (GLUCOSAMINE CHONDROITIN COMPLX) TABS  -- --  --       Sig: Take 1 tablet by mouth daily.    Class: Historical    Route: Oral     "latanoprost (XALATAN) 0.005 % ophthalmic solution  -- --  --       Sig: Place 1 drop into both eyes At Bedtime    Class: Historical    Route: Both Eyes    Lidocaine (LIDOCARE) 4 % Patch  -- -- 5/3/2023 --       Sig: Place 1 patch onto the skin    Class: Historical    Route: Transdermal    LOSARTAN POTASSIUM PO  -- --  --       Sig: Take 25 mg by mouth daily    Class: Historical    Route: Oral    Multiple Vitamin (MULTI-VITAMIN) per tablet  -- --  --       Sig: Take 1 tablet by mouth daily.    Class: Historical    Route: Oral    predniSONE (DELTASONE) 5 MG tablet  90 tablet 1 2/20/2024 --   [facility] - [facility] [address] [facility]    Sig: Take 1 tablet (5 mg) by mouth daily    Class: E-Prescribe    Route: Oral    pyRIDostigmine (MESTINON) 60 MG tablet  405 tablet 1 1/17/2024 --   [facility][id number] - [place] [address] AT [facility] OF  [address]    Sig: Take 1/2 tablet 3x a day or a total of 90 mg daily    Class: E-Prescribe    WARFARIN SODIUM PO  -- --  --       Sig: Take by mouth daily    Class: Historical    Route: Oral                   Physical Exam:     BP (!) 157/77 (BP Location: Right arm, Patient Position: Sitting, Cuff Size: Adult Regular)   Pulse 94   Ht 1.778 m (5' 10\")   Wt 68.3 kg (150 lb 8 oz)   SpO2 97%   BMI 21.59 kg/m     Physical Exam:   General: NAD  Extremities: Warm, dry  Neurologic:   Mental Status Exam: Alert, awake and easily engaged in interaction.   Cranial Nerves: PERRLA, EOMs present but gaze is disconjugate it.  There is diplopia and lateral gaze.  Right-sided ptosis with full covering of his eye., no nystagmus, facial movements symmetric,                 facial sensation intact to light touch, hearing intact to conversation, palate and uvula               rise symmetrically, no deviation in uvula or tongue, tongue midline and fully mobile                with no atrophy or fasciculations.    Motor: Normal tone in all four extremities, no " "atrophy or fasciculations.             Manual Motor Exam     Right Left A F  Right Left A F   Shoulder Abduction 4.5 4.5   Hip Flexion 4 3.5     Elbow Flexion 5 5   Knee Extension 5 5     Elbow Extension 5 5   Knee Flexion 5 5     Wrist Extension 5 5   Foot Dorsiflexion 5 5     Wrist flexion 5 5   Foot Plantar Flexion 5 5     Hip abduction 4 4   Hip adduction 4.5 4.5     Neck flexion 4 4             Gait: walks with a walker.            Data:   CBC:  Lab Results   Component Value Date    WBC 9.3 09/07/2023    WBC 9.3 10/26/2015     Lab Results   Component Value Date    RBC 3.74 09/07/2023    RBC 3.83 10/26/2015     Lab Results   Component Value Date    HGB 12.2 09/07/2023    HGB 12.1 10/26/2015     Lab Results   Component Value Date    HCT 36.3 09/07/2023    HCT 36.8 10/26/2015     No components found for: \"MCT\"  Lab Results   Component Value Date    MCV 97 09/07/2023    MCV 96 10/26/2015     Lab Results   Component Value Date    MCH 32.6 09/07/2023    MCH 31.6 10/26/2015     Lab Results   Component Value Date    MCHC 33.6 09/07/2023    MCHC 32.9 10/26/2015     Lab Results   Component Value Date    RDW 13.5 09/07/2023    RDW 13.5 10/26/2015     Lab Results   Component Value Date     09/07/2023     10/26/2015       Last Basic Metabolic Panel:  Lab Results   Component Value Date     09/07/2023     02/02/2010      Lab Results   Component Value Date    POTASSIUM 4.3 09/07/2023    POTASSIUM 4.0 02/02/2010     Lab Results   Component Value Date    CHLORIDE 101 09/07/2023    CHLORIDE 99 02/02/2010     Lab Results   Component Value Date    TYE 9.7 09/07/2023    TYE 10.1 02/02/2010     Lab Results   Component Value Date    CO2 27 09/07/2023    CO2 33 02/02/2010     Lab Results   Component Value Date    BUN 18.0 09/07/2023    BUN 25 02/02/2010     Lab Results   Component Value Date    CR 0.89 09/07/2023    CR 0.79 02/02/2010     Lab Results   Component Value Date     09/07/2023     " 02/02/2010            Assessment and Recommendations:     Washington Abdi is a 89 year old male presents with an acquired autoimmune generalized myasthenia gravis previously restricted to only mild ocular symptoms and was in remission for some time.  Total course of last year or so he started to have progression of his symptoms and currently presents with exacerbation with significant debilitating right-sided ptosis of the eyelid and significant weakness in the lower extremities.  At this point his weakness and lower extremity should be considered as exacerbation of myasthenia gravis.  Treatment options are limited due to potential high risk for complications such as use of prednisone along his high risk for potential side effects and development of diabetes.  Use of intravenous immunoglobulin is also not a benign treatment given his age as he is at high risk for thrombotic complications as well as high risk of developing renal insufficiency.  I have discussed with him alternative treatment with Vyvgart which may be the best option for him to get quick improvement in his symptoms.    Recommendations:  -Increase azathioprine to 150 mg daily.  -Continue Prednisone 5 mg  -Continue Pyrisdostigmine as tolerated  -Start Vyvgart  -Return to clinic in 3 months.    I have spent at least  40 min on the date of the encounter in face-to-face evaluation, chart review, patient visit, review of tests, counseling the patient, documentation about the issues documented above. See note for details.    Sincerely,        Dom Owens MD  Neurology and Neuromuscular medicine  [phone]

## 2024-04-30 ENCOUNTER — TELEPHONE (OUTPATIENT)
Dept: NEUROLOGY | Facility: CLINIC | Age: 89
End: 2024-04-30
Payer: MEDICARE

## 2024-04-30 DIAGNOSIS — G70.00 MYASTHENIA GRAVIS WITHOUT EXACERBATION (H): Primary | ICD-10-CM

## 2024-04-30 RX ORDER — EPINEPHRINE 1 MG/ML
0.3 INJECTION, SOLUTION, CONCENTRATE INTRAVENOUS EVERY 5 MIN PRN
Status: CANCELLED | OUTPATIENT
Start: 2024-04-30

## 2024-04-30 RX ORDER — HEPARIN SODIUM (PORCINE) LOCK FLUSH IV SOLN 100 UNIT/ML 100 UNIT/ML
5 SOLUTION INTRAVENOUS
Status: CANCELLED | OUTPATIENT
Start: 2024-04-30

## 2024-04-30 RX ORDER — DIPHENHYDRAMINE HYDROCHLORIDE 50 MG/ML
50 INJECTION INTRAMUSCULAR; INTRAVENOUS
Status: CANCELLED
Start: 2024-04-30

## 2024-04-30 RX ORDER — ALBUTEROL SULFATE 0.83 MG/ML
2.5 SOLUTION RESPIRATORY (INHALATION)
Status: CANCELLED | OUTPATIENT
Start: 2024-04-30

## 2024-04-30 RX ORDER — HEPARIN SODIUM,PORCINE 10 UNIT/ML
5-20 VIAL (ML) INTRAVENOUS DAILY PRN
Status: CANCELLED | OUTPATIENT
Start: 2024-04-30

## 2024-04-30 RX ORDER — MEPERIDINE HYDROCHLORIDE 25 MG/ML
25 INJECTION INTRAMUSCULAR; INTRAVENOUS; SUBCUTANEOUS EVERY 30 MIN PRN
Status: CANCELLED | OUTPATIENT
Start: 2024-04-30

## 2024-04-30 RX ORDER — ALBUTEROL SULFATE 90 UG/1
1-2 AEROSOL, METERED RESPIRATORY (INHALATION)
Status: CANCELLED
Start: 2024-04-30

## 2024-04-30 RX ORDER — METHYLPREDNISOLONE SODIUM SUCCINATE 125 MG/2ML
125 INJECTION, POWDER, LYOPHILIZED, FOR SOLUTION INTRAMUSCULAR; INTRAVENOUS
Status: CANCELLED
Start: 2024-04-30

## 2024-04-30 NOTE — TELEPHONE ENCOUNTER
Washington to begin Vyvgart infusions. Therapy plan routed to Dr. Owens for signature. Once signed, Washington will be notified to schedule.    Stacey Waller RN

## 2024-05-16 LAB — SCANNED LAB RESULT: NORMAL

## 2024-05-16 NOTE — TELEPHONE ENCOUNTER
Vyvgart infusions scheduled to begin on 5/21 at [facility]. Start form sent in to [facility].      Stacey Waller RN

## 2024-05-21 ENCOUNTER — INFUSION THERAPY VISIT (OUTPATIENT)
Dept: INFUSION THERAPY | Facility: CLINIC | Age: 89
End: 2024-05-21
Attending: INTERNAL MEDICINE
Payer: MEDICARE

## 2024-05-21 VITALS
RESPIRATION RATE: 16 BRPM | TEMPERATURE: 97.9 F | HEART RATE: 82 BPM | DIASTOLIC BLOOD PRESSURE: 73 MMHG | SYSTOLIC BLOOD PRESSURE: 138 MMHG | WEIGHT: 150 LBS | BODY MASS INDEX: 21.52 KG/M2 | OXYGEN SATURATION: 97 %

## 2024-05-21 DIAGNOSIS — G70.00 MYASTHENIA GRAVIS WITHOUT EXACERBATION (H): Primary | ICD-10-CM

## 2024-05-21 PROCEDURE — 250N000011 HC RX IP 250 OP 636: Performed by: PSYCHIATRY & NEUROLOGY

## 2024-05-21 PROCEDURE — 96413 CHEMO IV INFUSION 1 HR: CPT

## 2024-05-21 PROCEDURE — 258N000003 HC RX IP 258 OP 636: Performed by: PSYCHIATRY & NEUROLOGY

## 2024-05-21 RX ORDER — MEPERIDINE HYDROCHLORIDE 25 MG/ML
25 INJECTION INTRAMUSCULAR; INTRAVENOUS; SUBCUTANEOUS EVERY 30 MIN PRN
Status: CANCELLED | OUTPATIENT
Start: 2024-05-28

## 2024-05-21 RX ORDER — HEPARIN SODIUM,PORCINE 10 UNIT/ML
5-20 VIAL (ML) INTRAVENOUS DAILY PRN
Status: CANCELLED | OUTPATIENT
Start: 2024-05-28

## 2024-05-21 RX ORDER — EPINEPHRINE 1 MG/ML
0.3 INJECTION, SOLUTION INTRAMUSCULAR; SUBCUTANEOUS EVERY 5 MIN PRN
Status: CANCELLED | OUTPATIENT
Start: 2024-05-28

## 2024-05-21 RX ORDER — METHYLPREDNISOLONE SODIUM SUCCINATE 125 MG/2ML
125 INJECTION, POWDER, LYOPHILIZED, FOR SOLUTION INTRAMUSCULAR; INTRAVENOUS
Status: CANCELLED
Start: 2024-05-28

## 2024-05-21 RX ORDER — ALBUTEROL SULFATE 0.83 MG/ML
2.5 SOLUTION RESPIRATORY (INHALATION)
Status: CANCELLED | OUTPATIENT
Start: 2024-05-28

## 2024-05-21 RX ORDER — DIPHENHYDRAMINE HYDROCHLORIDE 50 MG/ML
50 INJECTION INTRAMUSCULAR; INTRAVENOUS
Status: CANCELLED
Start: 2024-05-28

## 2024-05-21 RX ORDER — ALBUTEROL SULFATE 90 UG/1
1-2 AEROSOL, METERED RESPIRATORY (INHALATION)
Status: CANCELLED
Start: 2024-05-28

## 2024-05-21 RX ORDER — HEPARIN SODIUM (PORCINE) LOCK FLUSH IV SOLN 100 UNIT/ML 100 UNIT/ML
5 SOLUTION INTRAVENOUS
Status: CANCELLED | OUTPATIENT
Start: 2024-05-28

## 2024-05-21 RX ADMIN — EFGARTIGIMOD ALFA 680 MG: 20 INJECTION INTRAVENOUS at 09:26

## 2024-05-21 RX ADMIN — SODIUM CHLORIDE 250 ML: 9 INJECTION, SOLUTION INTRAVENOUS at 09:26

## 2024-05-21 ASSESSMENT — PAIN SCALES - GENERAL: PAINLEVEL: NO PAIN (0)

## 2024-05-21 NOTE — PROGRESS NOTES
Infusion Nursing Note:  Washington Abdi presents today for Vyvgart (1/4).    Patient seen by provider today: No   present during visit today: Not Applicable.    Note: pt hypertensive on arrival. Pt states he has white coat syndrome and his home BP has been stable with antihypertensives. Recheck BP are OK.      Intravenous Access:  Peripheral IV placed.    Treatment Conditions:  Biological Infusion Checklist:  ~~~ NOTE: If the patient answers yes to any of the questions below, hold the infusion and contact ordering provider or on-call provider.    Have you recently had an elevated temperature, fever, chills, productive cough, coughing for 3 weeks or longer or hemoptysis,  abnormal vital signs, night sweats,  chest pain or have you noticed a decrease in your appetite, unexplained weight loss or fatigue? No  Do you have any open wounds or new incisions? No  Do you have any upcoming hospitalizations or surgeries? Does not include esophagogastroduodenoscopy, colonoscopy, endoscopic retrograde cholangiopancreatography (ERCP), endoscopic ultrasound (EUS), dental procedures or joint aspiration/steroid injections No  Do you currently have any signs of illness or infection or are you on any antibiotics? No  Have you had any new, sudden or worsening abdominal pain? No  Have you or anyone in your household received a live vaccination in the past 4 weeks? Please note: No live vaccines while on biologic/chemotherapy until 6 months after the last treatment. Patient can receive the flu vaccine (shot only), pneumovax and the Covid vaccine. It is optimal for the patient to get these vaccines mid cycle, but they can be given at any time as long as it is not on the day of the infusion. No  Have you recently been diagnosed with any new nervous system diseases (ie. Multiple sclerosis, Guillain [place], seizures, neurological changes) or cancer diagnosis? Are you on any form of radiation or chemotherapy? No  Are you pregnant or  breast feeding or do you have plans of pregnancy in the future? No  Have you been having any signs of worsening depression or suicidal ideations?  (benlysta only) No  Have there been any other new onset medical symptoms? No  Have you had any new blood clots? (IVIG only) No      Post Infusion Assessment:  Patient tolerated infusion without incident.  Patient observed for 60 minutes post Vyvgart per protocol.  Blood return noted pre and post infusion.  Site patent and intact, free from redness, edema or discomfort.  No evidence of extravasations.  Access discontinued per protocol.       Discharge Plan:   Discharge instructions reviewed with: Patient.  Patient and/or family verbalized understanding of discharge instructions and all questions answered.  Copy of AVS reviewed with patient and/or family.  Patient will return 5/28/24 for next appointment.  Patient discharged in stable condition accompanied by: self.  Departure Mode: Ambulatory with walker.      Eufemia Albarado RN

## 2024-05-28 ENCOUNTER — INFUSION THERAPY VISIT (OUTPATIENT)
Dept: INFUSION THERAPY | Facility: CLINIC | Age: 89
End: 2024-05-28
Attending: INTERNAL MEDICINE
Payer: MEDICARE

## 2024-05-28 VITALS
BODY MASS INDEX: 22.13 KG/M2 | TEMPERATURE: 98.1 F | RESPIRATION RATE: 18 BRPM | WEIGHT: 154.2 LBS | HEART RATE: 71 BPM | DIASTOLIC BLOOD PRESSURE: 65 MMHG | OXYGEN SATURATION: 98 % | SYSTOLIC BLOOD PRESSURE: 115 MMHG

## 2024-05-28 DIAGNOSIS — G70.00 MYASTHENIA GRAVIS WITHOUT EXACERBATION (H): Primary | ICD-10-CM

## 2024-05-28 PROCEDURE — 258N000003 HC RX IP 258 OP 636: Performed by: PSYCHIATRY & NEUROLOGY

## 2024-05-28 PROCEDURE — 96413 CHEMO IV INFUSION 1 HR: CPT

## 2024-05-28 PROCEDURE — 250N000011 HC RX IP 250 OP 636: Performed by: PSYCHIATRY & NEUROLOGY

## 2024-05-28 RX ORDER — HEPARIN SODIUM,PORCINE 10 UNIT/ML
5-20 VIAL (ML) INTRAVENOUS DAILY PRN
Status: CANCELLED | OUTPATIENT
Start: 2024-06-04

## 2024-05-28 RX ORDER — MEPERIDINE HYDROCHLORIDE 25 MG/ML
25 INJECTION INTRAMUSCULAR; INTRAVENOUS; SUBCUTANEOUS EVERY 30 MIN PRN
Status: CANCELLED | OUTPATIENT
Start: 2024-06-04

## 2024-05-28 RX ORDER — DIPHENHYDRAMINE HYDROCHLORIDE 50 MG/ML
50 INJECTION INTRAMUSCULAR; INTRAVENOUS
Status: CANCELLED
Start: 2024-06-04

## 2024-05-28 RX ORDER — EPINEPHRINE 1 MG/ML
0.3 INJECTION, SOLUTION INTRAMUSCULAR; SUBCUTANEOUS EVERY 5 MIN PRN
Status: CANCELLED | OUTPATIENT
Start: 2024-06-04

## 2024-05-28 RX ORDER — METHYLPREDNISOLONE SODIUM SUCCINATE 125 MG/2ML
125 INJECTION, POWDER, LYOPHILIZED, FOR SOLUTION INTRAMUSCULAR; INTRAVENOUS
Status: CANCELLED
Start: 2024-06-04

## 2024-05-28 RX ORDER — HEPARIN SODIUM (PORCINE) LOCK FLUSH IV SOLN 100 UNIT/ML 100 UNIT/ML
5 SOLUTION INTRAVENOUS
Status: CANCELLED | OUTPATIENT
Start: 2024-06-04

## 2024-05-28 RX ORDER — ALBUTEROL SULFATE 0.83 MG/ML
2.5 SOLUTION RESPIRATORY (INHALATION)
Status: CANCELLED | OUTPATIENT
Start: 2024-06-04

## 2024-05-28 RX ORDER — ALBUTEROL SULFATE 90 UG/1
1-2 AEROSOL, METERED RESPIRATORY (INHALATION)
Status: CANCELLED
Start: 2024-06-04

## 2024-05-28 RX ADMIN — EFGARTIGIMOD ALFA 700 MG: 20 INJECTION INTRAVENOUS at 09:01

## 2024-05-28 RX ADMIN — SODIUM CHLORIDE 250 ML: 9 INJECTION, SOLUTION INTRAVENOUS at 09:01

## 2024-05-28 NOTE — PROGRESS NOTES
Infusion Nursing Note:  [place] Abdi presents today for Vyvgart.    Patient seen by provider today: No   present during visit today: Not Applicable.    Note: N/A.      Intravenous Access:  Peripheral IV placed.    Treatment Conditions:  Results reviewed, labs MET treatment parameters, ok to proceed with treatment.  Biological Infusion Checklist:  ~~~ NOTE: If the patient answers yes to any of the questions below, hold the infusion and contact ordering provider or on-call provider.    Have you recently had an elevated temperature, fever, chills, productive cough, coughing for 3 weeks or longer or hemoptysis,  abnormal vital signs, night sweats,  chest pain or have you noticed a decrease in your appetite, unexplained weight loss or fatigue? No  Do you have any open wounds or new incisions? No  Do you have any upcoming hospitalizations or surgeries? Does not include esophagogastroduodenoscopy, colonoscopy, endoscopic retrograde cholangiopancreatography (ERCP), endoscopic ultrasound (EUS), dental procedures or joint aspiration/steroid injections No  Do you currently have any signs of illness or infection or are you on any antibiotics? No  Have you had any new, sudden or worsening abdominal pain? No  Have you or anyone in your household received a live vaccination in the past 4 weeks? Please note: No live vaccines while on biologic/chemotherapy until 6 months after the last treatment. Patient can receive the flu vaccine (shot only), pneumovax and the Covid vaccine. It is optimal for the patient to get these vaccines mid cycle, but they can be given at any time as long as it is not on the day of the infusion. No  Have you recently been diagnosed with any new nervous system diseases (ie. Multiple sclerosis, Guillain [place], seizures, neurological changes) or cancer diagnosis? Are you on any form of radiation or chemotherapy? No  Are you pregnant or breast feeding or do you have plans of pregnancy in the future?  No  Have you been having any signs of worsening depression or suicidal ideations?  (benlysta only) No  Have there been any other new onset medical symptoms? No  Have you had any new blood clots? (IVIG only) No      Post Infusion Assessment:  Patient tolerated infusion without incident.  Patient observed for 60 minutes post Vyvgart per protocol.  Blood return noted pre and post infusion.  Site patent and intact, free from redness, edema or discomfort.  No evidence of extravasations.  Access discontinued per protocol.       Discharge Plan:   Patient declined prescription refills.  Discharge instructions reviewed with: Patient.  Patient and/or family verbalized understanding of discharge instructions and all questions answered.  AVS to patient via [employer].  Patient will return 6/4 for next appointment.   Patient discharged in stable condition accompanied by: self.  Departure Mode: Ambulatory.      Milind Hawk RN

## 2024-06-04 ENCOUNTER — INFUSION THERAPY VISIT (OUTPATIENT)
Dept: INFUSION THERAPY | Facility: CLINIC | Age: 89
End: 2024-06-04
Attending: INTERNAL MEDICINE
Payer: MEDICARE

## 2024-06-04 VITALS
SYSTOLIC BLOOD PRESSURE: 134 MMHG | OXYGEN SATURATION: 98 % | HEART RATE: 72 BPM | TEMPERATURE: 98.2 F | RESPIRATION RATE: 18 BRPM | DIASTOLIC BLOOD PRESSURE: 70 MMHG

## 2024-06-04 DIAGNOSIS — G70.00 MYASTHENIA GRAVIS WITHOUT EXACERBATION (H): Primary | ICD-10-CM

## 2024-06-04 PROCEDURE — 258N000003 HC RX IP 258 OP 636: Performed by: PSYCHIATRY & NEUROLOGY

## 2024-06-04 PROCEDURE — 250N000011 HC RX IP 250 OP 636: Performed by: PSYCHIATRY & NEUROLOGY

## 2024-06-04 PROCEDURE — 96413 CHEMO IV INFUSION 1 HR: CPT

## 2024-06-04 RX ORDER — ALBUTEROL SULFATE 0.83 MG/ML
2.5 SOLUTION RESPIRATORY (INHALATION)
Status: CANCELLED | OUTPATIENT
Start: 2024-06-11

## 2024-06-04 RX ORDER — HEPARIN SODIUM (PORCINE) LOCK FLUSH IV SOLN 100 UNIT/ML 100 UNIT/ML
5 SOLUTION INTRAVENOUS
Status: CANCELLED | OUTPATIENT
Start: 2024-06-11

## 2024-06-04 RX ORDER — MEPERIDINE HYDROCHLORIDE 25 MG/ML
25 INJECTION INTRAMUSCULAR; INTRAVENOUS; SUBCUTANEOUS EVERY 30 MIN PRN
Status: CANCELLED | OUTPATIENT
Start: 2024-06-11

## 2024-06-04 RX ORDER — DIPHENHYDRAMINE HYDROCHLORIDE 50 MG/ML
50 INJECTION INTRAMUSCULAR; INTRAVENOUS
Status: CANCELLED
Start: 2024-06-11

## 2024-06-04 RX ORDER — EPINEPHRINE 1 MG/ML
0.3 INJECTION, SOLUTION INTRAMUSCULAR; SUBCUTANEOUS EVERY 5 MIN PRN
Status: CANCELLED | OUTPATIENT
Start: 2024-06-11

## 2024-06-04 RX ORDER — HEPARIN SODIUM,PORCINE 10 UNIT/ML
5-20 VIAL (ML) INTRAVENOUS DAILY PRN
Status: CANCELLED | OUTPATIENT
Start: 2024-06-11

## 2024-06-04 RX ORDER — METHYLPREDNISOLONE SODIUM SUCCINATE 125 MG/2ML
125 INJECTION, POWDER, LYOPHILIZED, FOR SOLUTION INTRAMUSCULAR; INTRAVENOUS
Status: CANCELLED
Start: 2024-06-11

## 2024-06-04 RX ORDER — ALBUTEROL SULFATE 90 UG/1
1-2 AEROSOL, METERED RESPIRATORY (INHALATION)
Status: CANCELLED
Start: 2024-06-11

## 2024-06-04 RX ADMIN — SODIUM CHLORIDE 250 ML: 9 INJECTION, SOLUTION INTRAVENOUS at 14:27

## 2024-06-04 RX ADMIN — EFGARTIGIMOD ALFA 700 MG: 20 INJECTION INTRAVENOUS at 14:48

## 2024-06-04 ASSESSMENT — PAIN SCALES - GENERAL: PAINLEVEL: NO PAIN (0)

## 2024-06-04 NOTE — PROGRESS NOTES
Infusion Nursing Note:  [place] Abdi presents today for Vyvgart.    Patient seen by provider today: No   present during visit today: Not Applicable.    Note: N/A.      Intravenous Access:  Peripheral IV placed.    Treatment Conditions:  Biological Infusion Checklist:  ~~~ NOTE: If the patient answers yes to any of the questions below, hold the infusion and contact ordering provider or on-call provider.    Have you recently had an elevated temperature, fever, chills, productive cough, coughing for 3 weeks or longer or hemoptysis,  abnormal vital signs, night sweats,  chest pain or have you noticed a decrease in your appetite, unexplained weight loss or fatigue? No  Do you have any open wounds or new incisions? No  Do you have any upcoming hospitalizations or surgeries? Does not include esophagogastroduodenoscopy, colonoscopy, endoscopic retrograde cholangiopancreatography (ERCP), endoscopic ultrasound (EUS), dental procedures or joint aspiration/steroid injections No  Do you currently have any signs of illness or infection or are you on any antibiotics? No  Have you had any new, sudden or worsening abdominal pain? No  Have you or anyone in your household received a live vaccination in the past 4 weeks? Please note: No live vaccines while on biologic/chemotherapy until 6 months after the last treatment. Patient can receive the flu vaccine (shot only), pneumovax and the Covid vaccine. It is optimal for the patient to get these vaccines mid cycle, but they can be given at any time as long as it is not on the day of the infusion. No  Have you recently been diagnosed with any new nervous system diseases (ie. Multiple sclerosis, Guillain [place], seizures, neurological changes) or cancer diagnosis? Are you on any form of radiation or chemotherapy? No  Are you pregnant or breast feeding or do you have plans of pregnancy in the future? No  Have you been having any signs of worsening depression or suicidal ideations?   (benlysta only) No  Have there been any other new onset medical symptoms? No  Have you had any new blood clots? (IVIG only) No      Post Infusion Assessment:  Patient tolerated infusion without incident.  Patient observed for 60 minutes post Vyvgart per protocol.  Blood return noted pre and post infusion.  Site patent and intact, free from redness, edema or discomfort.  No evidence of extravasations.  Access discontinued per protocol.       Discharge Plan:   Discharge instructions reviewed with: Patient and Family.  Patient and/or family verbalized understanding of discharge instructions and all questions answered.  AVS to patient via [employer].  Patient will return 6/11/24 for next appointment.   Patient discharged in stable condition accompanied by: self and son.  Departure Mode: Ambulatory with cane.      Eufemia Albarado RN

## 2024-06-11 ENCOUNTER — INFUSION THERAPY VISIT (OUTPATIENT)
Dept: INFUSION THERAPY | Facility: CLINIC | Age: 89
End: 2024-06-11
Attending: INTERNAL MEDICINE
Payer: MEDICARE

## 2024-06-11 VITALS
OXYGEN SATURATION: 96 % | HEART RATE: 72 BPM | SYSTOLIC BLOOD PRESSURE: 133 MMHG | RESPIRATION RATE: 16 BRPM | TEMPERATURE: 98 F | DIASTOLIC BLOOD PRESSURE: 55 MMHG

## 2024-06-11 DIAGNOSIS — G70.00 MYASTHENIA GRAVIS WITHOUT EXACERBATION (H): Primary | ICD-10-CM

## 2024-06-11 PROCEDURE — 250N000011 HC RX IP 250 OP 636: Mod: JW | Performed by: PSYCHIATRY & NEUROLOGY

## 2024-06-11 PROCEDURE — 96413 CHEMO IV INFUSION 1 HR: CPT

## 2024-06-11 PROCEDURE — 258N000003 HC RX IP 258 OP 636: Mod: JZ | Performed by: PSYCHIATRY & NEUROLOGY

## 2024-06-11 RX ORDER — MEPERIDINE HYDROCHLORIDE 25 MG/ML
25 INJECTION INTRAMUSCULAR; INTRAVENOUS; SUBCUTANEOUS EVERY 30 MIN PRN
OUTPATIENT
Start: 2024-06-18

## 2024-06-11 RX ORDER — EPINEPHRINE 1 MG/ML
0.3 INJECTION, SOLUTION INTRAMUSCULAR; SUBCUTANEOUS EVERY 5 MIN PRN
OUTPATIENT
Start: 2024-06-18

## 2024-06-11 RX ORDER — ALBUTEROL SULFATE 0.83 MG/ML
2.5 SOLUTION RESPIRATORY (INHALATION)
OUTPATIENT
Start: 2024-06-18

## 2024-06-11 RX ORDER — HEPARIN SODIUM,PORCINE 10 UNIT/ML
5-20 VIAL (ML) INTRAVENOUS DAILY PRN
OUTPATIENT
Start: 2024-06-18

## 2024-06-11 RX ORDER — DIPHENHYDRAMINE HYDROCHLORIDE 50 MG/ML
50 INJECTION INTRAMUSCULAR; INTRAVENOUS
Start: 2024-06-18

## 2024-06-11 RX ORDER — ALBUTEROL SULFATE 90 UG/1
1-2 AEROSOL, METERED RESPIRATORY (INHALATION)
Start: 2024-06-18

## 2024-06-11 RX ORDER — HEPARIN SODIUM (PORCINE) LOCK FLUSH IV SOLN 100 UNIT/ML 100 UNIT/ML
5 SOLUTION INTRAVENOUS
OUTPATIENT
Start: 2024-06-18

## 2024-06-11 RX ORDER — METHYLPREDNISOLONE SODIUM SUCCINATE 125 MG/2ML
125 INJECTION, POWDER, LYOPHILIZED, FOR SOLUTION INTRAMUSCULAR; INTRAVENOUS
Start: 2024-06-18

## 2024-06-11 RX ADMIN — EFGARTIGIMOD ALFA 700 MG: 20 INJECTION INTRAVENOUS at 13:25

## 2024-06-11 RX ADMIN — SODIUM CHLORIDE 250 ML: 9 INJECTION, SOLUTION INTRAVENOUS at 13:25

## 2024-06-11 NOTE — PROGRESS NOTES
Infusion Nursing Note:  [place] bAdi presents today for Vyvgart.    Patient seen by provider today: No   present during visit today: Not Applicable.    Note: N/A.      Intravenous Access:  Peripheral IV placed.    Treatment Conditions:  Biological Infusion Checklist:  ~~~ NOTE: If the patient answers yes to any of the questions below, hold the infusion and contact ordering provider or on-call provider.    Have you recently had an elevated temperature, fever, chills, productive cough, coughing for 3 weeks or longer or hemoptysis,  abnormal vital signs, night sweats,  chest pain or have you noticed a decrease in your appetite, unexplained weight loss or fatigue? No  Do you have any open wounds or new incisions? No  Do you have any upcoming hospitalizations or surgeries? Does not include esophagogastroduodenoscopy, colonoscopy, endoscopic retrograde cholangiopancreatography (ERCP), endoscopic ultrasound (EUS), dental procedures or joint aspiration/steroid injections No  Do you currently have any signs of illness or infection or are you on any antibiotics? No  Have you had any new, sudden or worsening abdominal pain? No  Have you or anyone in your household received a live vaccination in the past 4 weeks? Please note: No live vaccines while on biologic/chemotherapy until 6 months after the last treatment. Patient can receive the flu vaccine (shot only), pneumovax and the Covid vaccine. It is optimal for the patient to get these vaccines mid cycle, but they can be given at any time as long as it is not on the day of the infusion. No  Have you recently been diagnosed with any new nervous system diseases (ie. Multiple sclerosis, Guillain [place], seizures, neurological changes) or cancer diagnosis? Are you on any form of radiation or chemotherapy? No  Are you pregnant or breast feeding or do you have plans of pregnancy in the future? No  Have you been having any signs of worsening depression or suicidal ideations?   (benlysta only) No  Have there been any other new onset medical symptoms? No  Have you had any new blood clots? (IVIG only) No      Post Infusion Assessment:  Patient tolerated infusion without incident.  Patient observed for 60 minutes post Vyvgart per protocol.  Blood return noted pre and post infusion.  Site patent and intact, free from redness, edema or discomfort.  No evidence of extravasations.  Access discontinued per protocol.       Discharge Plan:   Discharge instructions reviewed with: Patient.  Patient and/or family verbalized understanding of discharge instructions and all questions answered.  Copy of AVS reviewed with patient and/or family.  Patient will return PRN for next appointment.  Patient discharged in stable condition accompanied by: self.  Departure Mode: Ambulatory.      Bruno Valdes RN

## 2024-06-24 DIAGNOSIS — G70.00 MYASTHENIA GRAVIS WITHOUT EXACERBATION (H): Primary | ICD-10-CM

## 2024-06-25 ENCOUNTER — TELEPHONE (OUTPATIENT)
Dept: NEUROLOGY | Facility: CLINIC | Age: 89
End: 2024-06-25

## 2024-06-25 NOTE — TELEPHONE ENCOUNTER
[place] completed his last Vyvgart infusion of his first cycle on 6/11. He reports improvement in ptosis and diplopia. Tolerated the infusions well. Next cycle can begin July 10 (50 days from the start of first cycle). Appointment scheduled with Dr. Owens at the end of July. Vyvgart orders pended to Dr. Owens.    Stacey Waller RN

## 2024-06-27 RX ORDER — AZATHIOPRINE 50 MG/1
150 TABLET ORAL DAILY
Qty: 90 TABLET | Refills: 1 | Status: SHIPPED | OUTPATIENT
Start: 2024-06-27 | End: 2024-08-26

## 2024-07-01 NOTE — TELEPHONE ENCOUNTER
[facility] Call Center    Phone Message    May a detailed message be left on voicemail: yes     Reason for Call:  Patient called to speak to care team, would like to know when the next round of infusion is?     Action Taken: Message routed to:  [facility] neurology    Travel Screening: Not Applicable     Date of Service:

## 2024-07-02 NOTE — TELEPHONE ENCOUNTER
Left a VMM, letting Washington know that he can schedule his next Vyvgart cycle to begin on 7/10.    Stacey Waller RN

## 2024-07-12 DIAGNOSIS — G70.00 MYASTHENIA GRAVIS WITHOUT EXACERBATION (H): Primary | ICD-10-CM

## 2024-07-12 RX ORDER — EPINEPHRINE 1 MG/ML
0.3 INJECTION, SOLUTION, CONCENTRATE INTRAVENOUS EVERY 5 MIN PRN
Status: CANCELLED | OUTPATIENT
Start: 2024-07-12

## 2024-07-12 RX ORDER — DIPHENHYDRAMINE HYDROCHLORIDE 50 MG/ML
50 INJECTION INTRAMUSCULAR; INTRAVENOUS
Status: CANCELLED
Start: 2024-07-12

## 2024-07-12 RX ORDER — ALBUTEROL SULFATE 0.83 MG/ML
2.5 SOLUTION RESPIRATORY (INHALATION)
Status: CANCELLED | OUTPATIENT
Start: 2024-07-12

## 2024-07-12 RX ORDER — ALBUTEROL SULFATE 90 UG/1
1-2 AEROSOL, METERED RESPIRATORY (INHALATION)
Status: CANCELLED
Start: 2024-07-12

## 2024-07-12 RX ORDER — HEPARIN SODIUM,PORCINE 10 UNIT/ML
5-20 VIAL (ML) INTRAVENOUS DAILY PRN
Status: CANCELLED | OUTPATIENT
Start: 2024-07-12

## 2024-07-12 RX ORDER — HEPARIN SODIUM (PORCINE) LOCK FLUSH IV SOLN 100 UNIT/ML 100 UNIT/ML
5 SOLUTION INTRAVENOUS
Status: CANCELLED | OUTPATIENT
Start: 2024-07-12

## 2024-07-12 RX ORDER — METHYLPREDNISOLONE SODIUM SUCCINATE 125 MG/2ML
125 INJECTION, POWDER, LYOPHILIZED, FOR SOLUTION INTRAMUSCULAR; INTRAVENOUS
Status: CANCELLED
Start: 2024-07-12

## 2024-07-12 RX ORDER — MEPERIDINE HYDROCHLORIDE 25 MG/ML
25 INJECTION INTRAMUSCULAR; INTRAVENOUS; SUBCUTANEOUS EVERY 30 MIN PRN
Status: CANCELLED | OUTPATIENT
Start: 2024-07-12

## 2024-07-16 ENCOUNTER — INFUSION THERAPY VISIT (OUTPATIENT)
Dept: INFUSION THERAPY | Facility: CLINIC | Age: 89
End: 2024-07-16
Attending: PSYCHIATRY & NEUROLOGY
Payer: MEDICARE

## 2024-07-16 VITALS
RESPIRATION RATE: 18 BRPM | SYSTOLIC BLOOD PRESSURE: 146 MMHG | TEMPERATURE: 98.3 F | OXYGEN SATURATION: 99 % | DIASTOLIC BLOOD PRESSURE: 71 MMHG | HEART RATE: 72 BPM | BODY MASS INDEX: 22.38 KG/M2 | WEIGHT: 156 LBS

## 2024-07-16 DIAGNOSIS — G70.00 MYASTHENIA GRAVIS WITHOUT EXACERBATION (H): Primary | ICD-10-CM

## 2024-07-16 PROCEDURE — 96413 CHEMO IV INFUSION 1 HR: CPT

## 2024-07-16 PROCEDURE — 258N000003 HC RX IP 258 OP 636: Performed by: PSYCHIATRY & NEUROLOGY

## 2024-07-16 PROCEDURE — 250N000011 HC RX IP 250 OP 636: Performed by: PSYCHIATRY & NEUROLOGY

## 2024-07-16 RX ORDER — ALBUTEROL SULFATE 0.83 MG/ML
2.5 SOLUTION RESPIRATORY (INHALATION)
Status: CANCELLED | OUTPATIENT
Start: 2024-07-23

## 2024-07-16 RX ORDER — METHYLPREDNISOLONE SODIUM SUCCINATE 125 MG/2ML
125 INJECTION, POWDER, LYOPHILIZED, FOR SOLUTION INTRAMUSCULAR; INTRAVENOUS
Status: CANCELLED
Start: 2024-07-23

## 2024-07-16 RX ORDER — HEPARIN SODIUM (PORCINE) LOCK FLUSH IV SOLN 100 UNIT/ML 100 UNIT/ML
5 SOLUTION INTRAVENOUS
Status: CANCELLED | OUTPATIENT
Start: 2024-07-23

## 2024-07-16 RX ORDER — HEPARIN SODIUM,PORCINE 10 UNIT/ML
5-20 VIAL (ML) INTRAVENOUS DAILY PRN
Status: CANCELLED | OUTPATIENT
Start: 2024-07-23

## 2024-07-16 RX ORDER — EPINEPHRINE 1 MG/ML
0.3 INJECTION, SOLUTION INTRAMUSCULAR; SUBCUTANEOUS EVERY 5 MIN PRN
Status: CANCELLED | OUTPATIENT
Start: 2024-07-23

## 2024-07-16 RX ORDER — ALBUTEROL SULFATE 90 UG/1
1-2 AEROSOL, METERED RESPIRATORY (INHALATION)
Status: CANCELLED
Start: 2024-07-23

## 2024-07-16 RX ORDER — DIPHENHYDRAMINE HYDROCHLORIDE 50 MG/ML
50 INJECTION INTRAMUSCULAR; INTRAVENOUS
Status: CANCELLED
Start: 2024-07-23

## 2024-07-16 RX ORDER — MEPERIDINE HYDROCHLORIDE 25 MG/ML
25 INJECTION INTRAMUSCULAR; INTRAVENOUS; SUBCUTANEOUS EVERY 30 MIN PRN
Status: CANCELLED | OUTPATIENT
Start: 2024-07-23

## 2024-07-16 RX ADMIN — EFGARTIGIMOD ALFA 700 MG: 20 INJECTION INTRAVENOUS at 14:43

## 2024-07-16 RX ADMIN — SODIUM CHLORIDE 250 ML: 9 INJECTION, SOLUTION INTRAVENOUS at 14:43

## 2024-07-16 ASSESSMENT — PAIN SCALES - GENERAL
PAINLEVEL: NO PAIN (0)
PAINLEVEL: NO PAIN (0)

## 2024-07-16 NOTE — PROGRESS NOTES
Infusion Nursing Note:  [place] SILVER Jin presents today for Vyvgart infusion.    Patient seen by provider today: No   present during visit today: Not Applicable.    Note: N/A.      Intravenous Access:  Peripheral IV placed.    Treatment Conditions:  Biological Infusion Checklist:  ~~~ NOTE: If the patient answers yes to any of the questions below, hold the infusion and contact ordering provider or on-call provider.    Have you recently had an elevated temperature, fever, chills, productive cough, coughing for 3 weeks or longer or hemoptysis,  abnormal vital signs, night sweats,  chest pain or have you noticed a decrease in your appetite, unexplained weight loss or fatigue? No  Do you have any open wounds or new incisions? No  Do you have any upcoming hospitalizations or surgeries? Does not include esophagogastroduodenoscopy, colonoscopy, endoscopic retrograde cholangiopancreatography (ERCP), endoscopic ultrasound (EUS), dental procedures or joint aspiration/steroid injections No  Do you currently have any signs of illness or infection or are you on any antibiotics? No  Have you had any new, sudden or worsening abdominal pain? No  Have you or anyone in your household received a live vaccination in the past 4 weeks? Please note: No live vaccines while on biologic/chemotherapy until 6 months after the last treatment. Patient can receive the flu vaccine (shot only), pneumovax and the Covid vaccine. It is optimal for the patient to get these vaccines mid cycle, but they can be given at any time as long as it is not on the day of the infusion. No  Have you recently been diagnosed with any new nervous system diseases (ie. Multiple sclerosis, Guillain [place], seizures, neurological changes) or cancer diagnosis? Are you on any form of radiation or chemotherapy? No  Are you pregnant or breast feeding or do you have plans of pregnancy in the future? No  Have you been having any signs of worsening depression or suicidal  ideations?  (benlysta only) No  Have there been any other new onset medical symptoms? No  Have you had any new blood clots? (IVIG only) No      Post Infusion Assessment:  Patient tolerated infusion without incident.  Patient observed for 60 minutes post infusion per protocol.  Blood return noted pre and post infusion.  Site patent and intact, free from redness, edema or discomfort.  No evidence of extravasations.  Access discontinued per protocol.       Discharge Plan:   Discharge instructions reviewed with: Patient.  Patient and/or family verbalized understanding of discharge instructions and all questions answered.  Patient discharged in stable condition accompanied by: self.  Departure Mode: Ambulatory.      Jaci Menendez RN

## 2024-07-23 ENCOUNTER — INFUSION THERAPY VISIT (OUTPATIENT)
Dept: INFUSION THERAPY | Facility: CLINIC | Age: 89
End: 2024-07-23
Attending: PSYCHIATRY & NEUROLOGY
Payer: MEDICARE

## 2024-07-23 VITALS
HEART RATE: 81 BPM | RESPIRATION RATE: 16 BRPM | DIASTOLIC BLOOD PRESSURE: 63 MMHG | SYSTOLIC BLOOD PRESSURE: 120 MMHG | TEMPERATURE: 98 F | OXYGEN SATURATION: 97 %

## 2024-07-23 DIAGNOSIS — G70.00 MYASTHENIA GRAVIS WITHOUT EXACERBATION (H): Primary | ICD-10-CM

## 2024-07-23 PROCEDURE — 258N000003 HC RX IP 258 OP 636: Performed by: PSYCHIATRY & NEUROLOGY

## 2024-07-23 PROCEDURE — 96413 CHEMO IV INFUSION 1 HR: CPT

## 2024-07-23 PROCEDURE — 250N000011 HC RX IP 250 OP 636: Performed by: PSYCHIATRY & NEUROLOGY

## 2024-07-23 RX ORDER — EPINEPHRINE 1 MG/ML
0.3 INJECTION, SOLUTION INTRAMUSCULAR; SUBCUTANEOUS EVERY 5 MIN PRN
Status: CANCELLED | OUTPATIENT
Start: 2024-07-30

## 2024-07-23 RX ORDER — HEPARIN SODIUM,PORCINE 10 UNIT/ML
5-20 VIAL (ML) INTRAVENOUS DAILY PRN
Status: CANCELLED | OUTPATIENT
Start: 2024-07-30

## 2024-07-23 RX ORDER — ALBUTEROL SULFATE 0.83 MG/ML
2.5 SOLUTION RESPIRATORY (INHALATION)
Status: CANCELLED | OUTPATIENT
Start: 2024-07-30

## 2024-07-23 RX ORDER — METHYLPREDNISOLONE SODIUM SUCCINATE 125 MG/2ML
125 INJECTION, POWDER, LYOPHILIZED, FOR SOLUTION INTRAMUSCULAR; INTRAVENOUS
Status: CANCELLED
Start: 2024-07-30

## 2024-07-23 RX ORDER — MEPERIDINE HYDROCHLORIDE 25 MG/ML
25 INJECTION INTRAMUSCULAR; INTRAVENOUS; SUBCUTANEOUS EVERY 30 MIN PRN
Status: CANCELLED | OUTPATIENT
Start: 2024-07-30

## 2024-07-23 RX ORDER — ALBUTEROL SULFATE 90 UG/1
1-2 AEROSOL, METERED RESPIRATORY (INHALATION)
Status: CANCELLED
Start: 2024-07-30

## 2024-07-23 RX ORDER — DIPHENHYDRAMINE HYDROCHLORIDE 50 MG/ML
50 INJECTION INTRAMUSCULAR; INTRAVENOUS
Status: CANCELLED
Start: 2024-07-30

## 2024-07-23 RX ORDER — HEPARIN SODIUM (PORCINE) LOCK FLUSH IV SOLN 100 UNIT/ML 100 UNIT/ML
5 SOLUTION INTRAVENOUS
Status: CANCELLED | OUTPATIENT
Start: 2024-07-30

## 2024-07-23 RX ADMIN — EFGARTIGIMOD ALFA 700 MG: 20 INJECTION INTRAVENOUS at 14:37

## 2024-07-23 RX ADMIN — SODIUM CHLORIDE 250 ML: 9 INJECTION, SOLUTION INTRAVENOUS at 14:32

## 2024-07-23 NOTE — PROGRESS NOTES
Infusion Nursing Note:  [place] Abdi presents today for Vyvgart.    Patient seen by provider today: No   present during visit today: Not Applicable.    Note: No new concerns this week.      Intravenous Access:  Peripheral IV placed.    Treatment Conditions:  Biological Infusion Checklist:  ~~~ NOTE: If the patient answers yes to any of the questions below, hold the infusion and contact ordering provider or on-call provider.    Have you recently had an elevated temperature, fever, chills, productive cough, coughing for 3 weeks or longer or hemoptysis,  abnormal vital signs, night sweats,  chest pain or have you noticed a decrease in your appetite, unexplained weight loss or fatigue? No  Do you have any open wounds or new incisions? No  Do you have any upcoming hospitalizations or surgeries? Does not include esophagogastroduodenoscopy, colonoscopy, endoscopic retrograde cholangiopancreatography (ERCP), endoscopic ultrasound (EUS), dental procedures or joint aspiration/steroid injections No  Do you currently have any signs of illness or infection or are you on any antibiotics? No  Have you had any new, sudden or worsening abdominal pain? No  Have you or anyone in your household received a live vaccination in the past 4 weeks? Please note: No live vaccines while on biologic/chemotherapy until 6 months after the last treatment. Patient can receive the flu vaccine (shot only), pneumovax and the Covid vaccine. It is optimal for the patient to get these vaccines mid cycle, but they can be given at any time as long as it is not on the day of the infusion. No  Have you recently been diagnosed with any new nervous system diseases (ie. Multiple sclerosis, Guillain [place], seizures, neurological changes) or cancer diagnosis? Are you on any form of radiation or chemotherapy? No  Are you pregnant or breast feeding or do you have plans of pregnancy in the future? No  Have you been having any signs of worsening depression  or suicidal ideations?  (benlysta only) No  Have there been any other new onset medical symptoms? No  Have you had any new blood clots? (IVIG only) No      Post Infusion Assessment:  Patient tolerated infusion without incident.  Patient observed for 60 minutes post vyvgart per protocol.  Blood return noted pre and post infusion.  Site patent and intact, free from redness, edema or discomfort.  No evidence of extravasations.  Access discontinued per protocol.       Discharge Plan:   AVS to patient via MYCHART.  Patient will return as prev maria dolores for next appointment.   Patient discharged in stable condition accompanied by: self.  Departure Mode: Ambulatory with cane.      Tristan Coronel RN

## 2024-07-31 ENCOUNTER — INFUSION THERAPY VISIT (OUTPATIENT)
Dept: INFUSION THERAPY | Facility: CLINIC | Age: 89
End: 2024-07-31
Attending: INTERNAL MEDICINE
Payer: MEDICARE

## 2024-07-31 VITALS
WEIGHT: 156 LBS | RESPIRATION RATE: 18 BRPM | HEART RATE: 81 BPM | TEMPERATURE: 98.1 F | DIASTOLIC BLOOD PRESSURE: 59 MMHG | SYSTOLIC BLOOD PRESSURE: 123 MMHG | OXYGEN SATURATION: 97 % | BODY MASS INDEX: 22.38 KG/M2

## 2024-07-31 DIAGNOSIS — G70.00 MYASTHENIA GRAVIS WITHOUT EXACERBATION (H): Primary | ICD-10-CM

## 2024-07-31 PROCEDURE — 250N000011 HC RX IP 250 OP 636: Performed by: PSYCHIATRY & NEUROLOGY

## 2024-07-31 PROCEDURE — 258N000003 HC RX IP 258 OP 636: Performed by: PSYCHIATRY & NEUROLOGY

## 2024-07-31 PROCEDURE — 96413 CHEMO IV INFUSION 1 HR: CPT

## 2024-07-31 RX ORDER — HEPARIN SODIUM,PORCINE 10 UNIT/ML
5-20 VIAL (ML) INTRAVENOUS DAILY PRN
Status: CANCELLED | OUTPATIENT
Start: 2024-08-06

## 2024-07-31 RX ORDER — EPINEPHRINE 1 MG/ML
0.3 INJECTION, SOLUTION INTRAMUSCULAR; SUBCUTANEOUS EVERY 5 MIN PRN
Status: CANCELLED | OUTPATIENT
Start: 2024-08-06

## 2024-07-31 RX ORDER — ALBUTEROL SULFATE 0.83 MG/ML
2.5 SOLUTION RESPIRATORY (INHALATION)
Status: CANCELLED | OUTPATIENT
Start: 2024-08-06

## 2024-07-31 RX ORDER — METHYLPREDNISOLONE SODIUM SUCCINATE 125 MG/2ML
125 INJECTION, POWDER, LYOPHILIZED, FOR SOLUTION INTRAMUSCULAR; INTRAVENOUS
Status: CANCELLED
Start: 2024-08-06

## 2024-07-31 RX ORDER — HEPARIN SODIUM (PORCINE) LOCK FLUSH IV SOLN 100 UNIT/ML 100 UNIT/ML
5 SOLUTION INTRAVENOUS
Status: CANCELLED | OUTPATIENT
Start: 2024-08-06

## 2024-07-31 RX ORDER — MEPERIDINE HYDROCHLORIDE 25 MG/ML
25 INJECTION INTRAMUSCULAR; INTRAVENOUS; SUBCUTANEOUS EVERY 30 MIN PRN
Status: CANCELLED | OUTPATIENT
Start: 2024-08-06

## 2024-07-31 RX ORDER — ALBUTEROL SULFATE 90 UG/1
1-2 AEROSOL, METERED RESPIRATORY (INHALATION)
Status: CANCELLED
Start: 2024-08-06

## 2024-07-31 RX ORDER — DIPHENHYDRAMINE HYDROCHLORIDE 50 MG/ML
50 INJECTION INTRAMUSCULAR; INTRAVENOUS
Status: CANCELLED
Start: 2024-08-06

## 2024-07-31 RX ADMIN — EFGARTIGIMOD ALFA 700 MG: 20 INJECTION INTRAVENOUS at 10:29

## 2024-07-31 RX ADMIN — SODIUM CHLORIDE 250 ML: 9 INJECTION, SOLUTION INTRAVENOUS at 10:29

## 2024-07-31 ASSESSMENT — PAIN SCALES - GENERAL: PAINLEVEL: NO PAIN (0)

## 2024-07-31 NOTE — PROGRESS NOTES
Infusion Nursing Note:  [place] Abdi presents today for vyvgart.    Patient seen by provider today: No   present during visit today: Not Applicable.    Note: N/A.      Intravenous Access:  Peripheral IV placed.    Treatment Conditions:  Biological Infusion Checklist:  ~~~ NOTE: If the patient answers yes to any of the questions below, hold the infusion and contact ordering provider or on-call provider.    Have you recently had an elevated temperature, fever, chills, productive cough, coughing for 3 weeks or longer or hemoptysis,  abnormal vital signs, night sweats,  chest pain or have you noticed a decrease in your appetite, unexplained weight loss or fatigue? No  Do you have any open wounds or new incisions? No  Do you have any upcoming hospitalizations or surgeries? Does not include esophagogastroduodenoscopy, colonoscopy, endoscopic retrograde cholangiopancreatography (ERCP), endoscopic ultrasound (EUS), dental procedures or joint aspiration/steroid injections No  Do you currently have any signs of illness or infection or are you on any antibiotics? No  Have you had any new, sudden or worsening abdominal pain? No  Have you or anyone in your household received a live vaccination in the past 4 weeks? Please note: No live vaccines while on biologic/chemotherapy until 6 months after the last treatment. Patient can receive the flu vaccine (shot only), pneumovax and the Covid vaccine. It is optimal for the patient to get these vaccines mid cycle, but they can be given at any time as long as it is not on the day of the infusion. No  Have you recently been diagnosed with any new nervous system diseases (ie. Multiple sclerosis, Guillain [place], seizures, neurological changes) or cancer diagnosis? Are you on any form of radiation or chemotherapy? No  Are you pregnant or breast feeding or do you have plans of pregnancy in the future? No  Have you been having any signs of worsening depression or suicidal ideations?   (benlysta only) No  Have there been any other new onset medical symptoms? No  Have you had any new blood clots? (IVIG only) No      Post Infusion Assessment:  Patient tolerated infusion without incident.  Blood return noted pre and post infusion.  Site patent and intact, free from redness, edema or discomfort.  No evidence of extravasations.  Access discontinued per protocol.       Discharge Plan:   Discharge instructions reviewed with: Patient.  Patient and/or family verbalized understanding of discharge instructions and all questions answered.  Patient discharged in stable condition accompanied by: self.  Departure Mode: Ambulatory with cane.      Jaci Menendez RN

## 2024-08-07 ENCOUNTER — INFUSION THERAPY VISIT (OUTPATIENT)
Dept: INFUSION THERAPY | Facility: CLINIC | Age: 89
End: 2024-08-07
Payer: MEDICARE

## 2024-08-07 VITALS
DIASTOLIC BLOOD PRESSURE: 54 MMHG | OXYGEN SATURATION: 97 % | BODY MASS INDEX: 21.95 KG/M2 | SYSTOLIC BLOOD PRESSURE: 107 MMHG | RESPIRATION RATE: 16 BRPM | WEIGHT: 153 LBS | TEMPERATURE: 97.7 F | HEART RATE: 83 BPM

## 2024-08-07 DIAGNOSIS — G70.00 MYASTHENIA GRAVIS WITHOUT EXACERBATION (H): Primary | ICD-10-CM

## 2024-08-07 PROCEDURE — 96413 CHEMO IV INFUSION 1 HR: CPT

## 2024-08-07 PROCEDURE — 258N000003 HC RX IP 258 OP 636: Performed by: PSYCHIATRY & NEUROLOGY

## 2024-08-07 PROCEDURE — 250N000011 HC RX IP 250 OP 636: Mod: JW | Performed by: PSYCHIATRY & NEUROLOGY

## 2024-08-07 RX ORDER — HEPARIN SODIUM (PORCINE) LOCK FLUSH IV SOLN 100 UNIT/ML 100 UNIT/ML
5 SOLUTION INTRAVENOUS
OUTPATIENT
Start: 2024-08-13

## 2024-08-07 RX ORDER — METHYLPREDNISOLONE SODIUM SUCCINATE 125 MG/2ML
125 INJECTION, POWDER, LYOPHILIZED, FOR SOLUTION INTRAMUSCULAR; INTRAVENOUS
Start: 2024-08-13

## 2024-08-07 RX ORDER — MEPERIDINE HYDROCHLORIDE 25 MG/ML
25 INJECTION INTRAMUSCULAR; INTRAVENOUS; SUBCUTANEOUS EVERY 30 MIN PRN
OUTPATIENT
Start: 2024-08-13

## 2024-08-07 RX ORDER — ALBUTEROL SULFATE 0.83 MG/ML
2.5 SOLUTION RESPIRATORY (INHALATION)
OUTPATIENT
Start: 2024-08-13

## 2024-08-07 RX ORDER — EPINEPHRINE 1 MG/ML
0.3 INJECTION, SOLUTION INTRAMUSCULAR; SUBCUTANEOUS EVERY 5 MIN PRN
OUTPATIENT
Start: 2024-08-13

## 2024-08-07 RX ORDER — ALBUTEROL SULFATE 90 UG/1
1-2 AEROSOL, METERED RESPIRATORY (INHALATION)
Start: 2024-08-13

## 2024-08-07 RX ORDER — HEPARIN SODIUM,PORCINE 10 UNIT/ML
5-20 VIAL (ML) INTRAVENOUS DAILY PRN
OUTPATIENT
Start: 2024-08-13

## 2024-08-07 RX ORDER — DIPHENHYDRAMINE HYDROCHLORIDE 50 MG/ML
50 INJECTION INTRAMUSCULAR; INTRAVENOUS
Start: 2024-08-13

## 2024-08-07 RX ADMIN — SODIUM CHLORIDE 250 ML: 9 INJECTION, SOLUTION INTRAVENOUS at 12:00

## 2024-08-07 RX ADMIN — EFGARTIGIMOD ALFA 700 MG: 20 INJECTION INTRAVENOUS at 12:04

## 2024-08-08 ENCOUNTER — TELEPHONE (OUTPATIENT)
Dept: NEUROLOGY | Facility: CLINIC | Age: 89
End: 2024-08-08
Payer: MEDICARE

## 2024-08-08 DIAGNOSIS — G70.00 MYASTHENIA GRAVIS WITHOUT EXACERBATION (H): Primary | ICD-10-CM

## 2024-08-08 RX ORDER — METHYLPREDNISOLONE SODIUM SUCCINATE 125 MG/2ML
125 INJECTION, POWDER, LYOPHILIZED, FOR SOLUTION INTRAMUSCULAR; INTRAVENOUS
Start: 2024-09-02

## 2024-08-08 RX ORDER — DIPHENHYDRAMINE HYDROCHLORIDE 50 MG/ML
50 INJECTION INTRAMUSCULAR; INTRAVENOUS
Start: 2024-09-02

## 2024-08-08 RX ORDER — HEPARIN SODIUM,PORCINE 10 UNIT/ML
5-20 VIAL (ML) INTRAVENOUS DAILY PRN
OUTPATIENT
Start: 2024-09-02

## 2024-08-08 RX ORDER — ALBUTEROL SULFATE 0.83 MG/ML
2.5 SOLUTION RESPIRATORY (INHALATION)
OUTPATIENT
Start: 2024-09-02

## 2024-08-08 RX ORDER — HEPARIN SODIUM (PORCINE) LOCK FLUSH IV SOLN 100 UNIT/ML 100 UNIT/ML
5 SOLUTION INTRAVENOUS
OUTPATIENT
Start: 2024-09-02

## 2024-08-08 RX ORDER — MEPERIDINE HYDROCHLORIDE 25 MG/ML
25 INJECTION INTRAMUSCULAR; INTRAVENOUS; SUBCUTANEOUS EVERY 30 MIN PRN
OUTPATIENT
Start: 2024-09-02

## 2024-08-08 RX ORDER — EPINEPHRINE 1 MG/ML
0.3 INJECTION, SOLUTION, CONCENTRATE INTRAVENOUS EVERY 5 MIN PRN
OUTPATIENT
Start: 2024-09-02

## 2024-08-08 RX ORDER — ALBUTEROL SULFATE 90 UG/1
1-2 AEROSOL, METERED RESPIRATORY (INHALATION)
Start: 2024-09-02

## 2024-08-08 NOTE — TELEPHONE ENCOUNTER
[facility] Call Center    Phone Message    May a detailed message be left on voicemail: yes     Reason for Call: Pt recently had 4 infusions and would like to know what's next.  Please call Washington at # [phone] to discuss further.    Action Taken: Message routed to:  [facility] Neurology    Travel Screening: Not Applicable     Date of Service:

## 2024-08-08 NOTE — TELEPHONE ENCOUNTER
Washington completed his last Vyvgart infusion on 8/7. Cycle began on 7/16 so next cycle can begin 50 days after; 9/4.  New orders routed to Dr. Owens.  Once signed, Washington will be contacted to schedule.  Washington also due for follow up with Dr. Owens.    Stacey Waller RN

## 2024-09-25 NOTE — TELEPHONE ENCOUNTER
"Spoke with Washington.  He never started his last cycle of Vyvgart because he fell and broke his hip.  Last Vyvgart infusion was 8/7. Washington reports his MG is stable. He denies any vision changes or ptosis. States \"I'm feeling pretty good\". He is more mobile now and would be able to get infusions at this time. Will notify Dr. Owens.    Stacey Waller RN    "

## 2024-10-01 NOTE — TELEPHONE ENCOUNTER
Spoke with Washington and confirmed he is on 50 mg of Imuran daily. Washington does NOT take mestinon. He states he hasn't been on it in years due to GI side effects.  Washington reports he's feeling good. Will notify Dr. Owens.    Stacey Waller RN

## 2024-10-08 DIAGNOSIS — G70.00 MYASTHENIA GRAVIS WITHOUT EXACERBATION (H): ICD-10-CM

## 2024-10-08 RX ORDER — AZATHIOPRINE 50 MG/1
TABLET ORAL
Qty: 90 TABLET | Refills: 1 | Status: SHIPPED | OUTPATIENT
Start: 2024-10-08

## 2025-01-08 DIAGNOSIS — G70.00 MYASTHENIA GRAVIS WITHOUT EXACERBATION (H): ICD-10-CM

## 2025-01-08 RX ORDER — AZATHIOPRINE 50 MG/1
TABLET ORAL
Qty: 90 TABLET | Refills: 1 | Status: SHIPPED | OUTPATIENT
Start: 2025-01-08

## 2025-02-04 DIAGNOSIS — G70.00 MYASTHENIA GRAVIS WITHOUT EXACERBATION (H): ICD-10-CM

## 2025-02-04 RX ORDER — PREDNISONE 5 MG/1
5 TABLET ORAL DAILY
Qty: 90 TABLET | Refills: 1 | Status: SHIPPED | OUTPATIENT
Start: 2025-02-04

## 2025-03-25 DIAGNOSIS — G70.00 MYASTHENIA GRAVIS WITHOUT EXACERBATION (H): ICD-10-CM

## 2025-03-26 RX ORDER — AZATHIOPRINE 50 MG/1
TABLET ORAL
Qty: 90 TABLET | Refills: 1 | Status: SHIPPED | OUTPATIENT
Start: 2025-03-26

## 2025-04-17 ENCOUNTER — TELEPHONE (OUTPATIENT)
Dept: NEUROLOGY | Facility: CLINIC | Age: OVER 89
End: 2025-04-17

## 2025-04-17 NOTE — TELEPHONE ENCOUNTER
Washington left a VMM, reporting worsening of symptoms (left eye ptosis). VMM left for Washington, asking him to contact the clinic to discuss further.    Stacey Waller RN

## 2025-04-30 NOTE — TELEPHONE ENCOUNTER
Spoke with [place].  He has had increasing diplopia for the past week.  It's intermittent but occurring daily. No other increase in MG symptoms (no bulbar symptoms, no SOB, no worsening weakness).  Currently on prednisone (5 mg daily). And Imuran.  Routing to Dr. Owens.    Stacey Waller RN

## 2025-04-30 NOTE — TELEPHONE ENCOUNTER
[facility] Call Center    Phone Message    May a detailed message be left on voicemail: yes     Reason for Call: Other:       Patient returning call to clinic to discuss double vision concerns. Requesting call back to phone #[phone].     Action Taken: Message routed to:  [facility] Neurology    Travel Screening: Not Applicable

## 2025-05-01 ENCOUNTER — TELEPHONE (OUTPATIENT)
Dept: NEUROLOGY | Facility: CLINIC | Age: OVER 89
End: 2025-05-01
Payer: MEDICARE

## 2025-05-01 NOTE — TELEPHONE ENCOUNTER
Left Voicemail (1st Attempt) for the patient to call back and schedule the following:    Appointment type: Return MG  Provider: Dr. Owens  Return date: next avail  Specialty phone number: [phone]  Additional appointment(s) needed: NA  Additonal Notes:

## 2025-05-05 ENCOUNTER — TELEPHONE (OUTPATIENT)
Dept: NEUROLOGY | Facility: CLINIC | Age: OVER 89
End: 2025-05-05
Payer: MEDICARE

## 2025-05-05 NOTE — TELEPHONE ENCOUNTER
Left Voicemail (2nd Attempt), sent a letter for the patient to call back and schedule the following:    Appointment type: Return MG  Provider: Dr. Owens  Return date: next avail  Specialty phone number: [phone]  Additional appointment(s) needed: NA  Additonal Notes:

## 2025-05-27 DIAGNOSIS — G70.00 MYASTHENIA GRAVIS WITHOUT EXACERBATION (H): ICD-10-CM

## 2025-05-27 RX ORDER — AZATHIOPRINE 50 MG/1
TABLET ORAL
Qty: 90 TABLET | Refills: 1 | Status: SHIPPED | OUTPATIENT
Start: 2025-05-27

## 2025-07-29 DIAGNOSIS — G70.00 MYASTHENIA GRAVIS WITHOUT EXACERBATION (H): ICD-10-CM

## 2025-07-29 RX ORDER — AZATHIOPRINE 50 MG/1
150 TABLET ORAL DAILY
Qty: 90 TABLET | Refills: 1 | Status: SHIPPED | OUTPATIENT
Start: 2025-07-29

## 2025-08-04 DIAGNOSIS — G70.00 MYASTHENIA GRAVIS WITHOUT EXACERBATION (H): Primary | ICD-10-CM

## 2025-08-05 DIAGNOSIS — G70.00 MYASTHENIA GRAVIS WITHOUT EXACERBATION (H): ICD-10-CM

## 2025-08-05 RX ORDER — PREDNISONE 5 MG/1
5 TABLET ORAL DAILY
Qty: 90 TABLET | Refills: 1 | Status: SHIPPED | OUTPATIENT
Start: 2025-08-05

## (undated) RX ORDER — FENTANYL CITRATE 50 UG/ML
INJECTION, SOLUTION INTRAMUSCULAR; INTRAVENOUS
Status: DISPENSED
Start: 2017-01-30

## (undated) RX ORDER — DIPHENHYDRAMINE HYDROCHLORIDE 50 MG/ML
INJECTION INTRAMUSCULAR; INTRAVENOUS
Status: DISPENSED
Start: 2017-01-30